# Patient Record
Sex: MALE | Race: WHITE | NOT HISPANIC OR LATINO | Employment: FULL TIME | URBAN - METROPOLITAN AREA
[De-identification: names, ages, dates, MRNs, and addresses within clinical notes are randomized per-mention and may not be internally consistent; named-entity substitution may affect disease eponyms.]

---

## 2017-04-12 ENCOUNTER — ALLSCRIPTS OFFICE VISIT (OUTPATIENT)
Dept: OTHER | Facility: OTHER | Age: 52
End: 2017-04-12

## 2017-06-07 ENCOUNTER — ALLSCRIPTS OFFICE VISIT (OUTPATIENT)
Dept: OTHER | Facility: OTHER | Age: 52
End: 2017-06-07

## 2017-11-15 ENCOUNTER — ALLSCRIPTS OFFICE VISIT (OUTPATIENT)
Dept: OTHER | Facility: OTHER | Age: 52
End: 2017-11-15

## 2017-11-17 ENCOUNTER — GENERIC CONVERSION - ENCOUNTER (OUTPATIENT)
Dept: OTHER | Facility: OTHER | Age: 52
End: 2017-11-17

## 2017-11-17 LAB
A/G RATIO (HISTORICAL): 2.1 (ref 1.2–2.2)
ALBUMIN SERPL BCP-MCNC: 4.6 G/DL (ref 3.5–5.5)
ALP SERPL-CCNC: 111 IU/L (ref 39–117)
ALT SERPL W P-5'-P-CCNC: 31 IU/L (ref 0–44)
AST SERPL W P-5'-P-CCNC: 31 IU/L (ref 0–40)
BILIRUB SERPL-MCNC: 0.9 MG/DL (ref 0–1.2)
BUN SERPL-MCNC: 14 MG/DL (ref 6–24)
BUN/CREA RATIO (HISTORICAL): 13 (ref 9–20)
CALCIUM SERPL-MCNC: 9.2 MG/DL (ref 8.7–10.2)
CHLORIDE SERPL-SCNC: 101 MMOL/L (ref 96–106)
CHOLEST SERPL-MCNC: 135 MG/DL (ref 100–199)
CO2 SERPL-SCNC: 21 MMOL/L (ref 18–29)
CREAT SERPL-MCNC: 1.06 MG/DL (ref 0.76–1.27)
CREATININE, URINE (HISTORICAL): 231.7 MG/DL
EGFR AFRICAN AMERICAN (HISTORICAL): 94 ML/MIN/1.73
EGFR-AMERICAN CALC (HISTORICAL): 81 ML/MIN/1.73
GLUCOSE SERPL-MCNC: 87 MG/DL (ref 65–99)
HDLC SERPL-MCNC: 31 MG/DL
LDLC SERPL CALC-MCNC: 75 MG/DL (ref 0–99)
MICROALBUM.,U,RANDOM (HISTORICAL): 5.7 UG/ML
MICROALBUMIN/CREATININE RATIO (HISTORICAL): 2.5 MG/G CREAT (ref 0–30)
POTASSIUM SERPL-SCNC: 3.9 MMOL/L (ref 3.5–5.2)
PROSTATE SPECIFIC ANTIGEN (HISTORICAL): 1 NG/ML (ref 0–4)
SODIUM SERPL-SCNC: 140 MMOL/L (ref 134–144)
TOT. GLOBULIN, SERUM (HISTORICAL): 2.2 G/DL (ref 1.5–4.5)
TOTAL PROTEIN (HISTORICAL): 6.8 G/DL (ref 6–8.5)
TRIGL SERPL-MCNC: 146 MG/DL (ref 0–149)
VLDLC SERPL CALC-MCNC: 29 MG/DL (ref 5–40)

## 2017-11-17 NOTE — PROGRESS NOTES
Assessment    1  Benign essential hypertension (401 1) (I10)   2  MTHFR mutation (270 4) (E72 12)   3  Hyperlipidemia (272 4) (E78 5)   4  Depression with anxiety (300 4) (F41 8)   5  Acute upper respiratory infection (465 9) (J06 9)    Plan  Abnormal stress ECG with treadmill, PMH: History of chest pain, Hyperlipidemia    · Rosuvastatin Calcium 40 MG Oral Tablet (Crestor); TAKE 1/2 TABLET DAILY   · Aspirin 81 MG TABS; take two tablets daily with food  Anxiety    · Citalopram Hydrobromide 40 MG Oral Tablet; 1 every day  Benign essential hypertension, Encounter for prostate cancer screening,Hyperlipidemia, MTHFR mutation, Obesity, Vitamin D deficiency    · (1) PSA (SCREEN) (Dx V76 44 Screen for Prostate Cancer); Status:Active; Requestedfor:01Ynw2448;   Benign essential hypertension, Hyperlipidemia    · Losartan Potassium 50 MG Oral Tablet; take half tablet for three and than daily  Benign essential hypertension, Hyperlipidemia, MTHFR mutation, Obesity, Vitamin Ddeficiency    · (1) MICROALBUMIN CREATININE RATIO, RANDOM URINE; Status:Active; Requestedfor:44Erx2674;    · (LC) Comp  Metabolic Panel (14); Status:Active; Requested for:90Vsq1268;    · (LC) Homocyst(e)ine, Plasma; Status:Active; Requested for:05Wnw7295;    · ([facility]) Lipid Panel; Status:Active; Requested for:62Igx5395;    · (LC) TSH Rfx on Abnormal to Free T4; Status:Active; Requested for:12Xks8432;    · (LC) Vitamin D, 25-Hydroxy, Total; Status:Active; Requested for:17Yoc1115;   Benign essential hypertension, PMH: History of chest pain, PMH: Myalgia    · Coenzyme Q-10 200 MG Oral Capsule; take 1 capsule daily  Benign essential hypertension, PMH: History of shortness of breath, PMH:Lightheadedness, MTHFR mutation    · Atenolol 25 MG Oral Tablet; TAKE 1 TABLET DAILY  Esophageal reflux    · RABEprazole Sodium 20 MG Oral Tablet Delayed Release (Aciphex); TAKE 1TABLET TWICE DAILY  Hyperlipidemia    · Ezetimibe 10 MG Oral Tablet;  Take 1 tablet daily  MTHFR mutation · Intrinsi B12-Folate 430-568-23 MCG-MCG-MG Oral Tablet; Take 1 tablet daily  Vitamin D deficiency    · Vitamin D 2000 UNIT Oral Capsule; one tab daily with food    Discussion/Summary    Labs are outdated, complete same  Patient has an appointment with Dr Maribel Greco in the near future, advised to complete labs prior to that visit  No medication changes at this time  Stable chronic conditions  infection is likely viral, may use over-the-counter guaifenesin and increase fluids as well as may use saline nasal spray as needed  Return to the office if symptoms worsen or fever develops  The patient was counseled regarding instructions for management,-- risk factor reductions,-- impressions,-- risks and benefits of treatment options,-- importance of compliance with treatment  Chief Complaint  Patient here for chronic condition follow up  Patient also complaining of sinus pain and pressure  bh/lpn      History of Present Illness  Multi chronic problem follow-up with one new complaint  Hypertension: Blood pressure at goal at the time of the visit  Labs are outdated  Patient reports periodic blood pressure testing shows stable control of his blood pressure  He reports compliance with his atenolol and losartan  No cardiovascular complaints of concern at the time of the visit  He has an appointment with his cardiologist Dr bowen goodwin in the near future  MTHFR polymorphism: Mono reports compliance with his intrinsics supplementation  His last homocystine level was a little above nine  He is due for re-evaluation with his current intrinsic therapy  He is maintaining a baby aspirin daily, his depression is currently stable see below  Depression: Mono reports that he feels his current dose of citalopram is providing adequate symptom remission for his depression  He denies depression or anxiety symptoms at the time of the visit despite having difficulties with his son coping with depression as well   He denies suicidal/homicidal ideation, he denies sleep disorder  He would like to continue his current dose of citalopram  See PHQ-9 below  Hyperlipidemia: Cleo Eagle is overdue for blood work to monitor his hyperlipidemia  His boss and heart did show an MTHFR polymorphism  He is maintaining his intrinsic therapy as above  He also reports that he is maintaining his receive a statin  He states he has missed some periods of time of his acetamide due to being on brand-name dispensed and cost  Plan to switch that to generic as itemized today  He denies medication side effects with his statin management medications  He is not currently taking fish oil  He is maintaining his Co Q10 therapy  New complaint onset a week and half ago patient reports noticing nasal congestion as well as worsening pain and pressure in the ears and in the maxillary sinus area he reports associated green nasal discharge and postnasal drip  He denies a cough or sore throat  No treatment at home  His symptoms are minimally improved since onset  The patient states his depression has been stable since the last visit  They have had recurrent episodes of major depression  He describes this as moderate in severity  He has no comorbid illnesses  He has had no significant interval events  Interval Symptoms: The patient is currently asymptomatic  Associated symptoms include:  No associated symptoms are reported  Structured Questionnaire:  Over the past 2 weeks, how often have you been bothered by the following problems? 1 ) Little interest or pleasure in doing things? Not at all   2 ) Feeling down, depressed or hopeless? Not at all   3 ) Trouble falling asleep or sleeping too much? Not at all   4 ) Feeling tired or having little energy? Not at all   5 ) Poor appetite or overeating? Not at all   6 ) Feeling bad about yourself, or that you are a failure, or have let yourself or your family down?  Not at all   7 ) Trouble concentrating on things, such as reading a newspaper or watching television? Not at all   8 ) Moving or speaking so slowly that other people could have noticed, or the opposite, moving or speaking faster than usual? Not at all  TOTAL SCORE: 0,-- severity of depression is mild  How difficult have these problems made it for you to do your work, take care of things at home, or get along with people? Not at all  Social Support: the patient has good social support  Medications: the patient is adherent with his medication regimen  -- He denies medication side effects  Review of Systems   Constitutional: No fever or chills, feels well, no tiredness, no recent weight gain or weight loss  Eyes: No complaints of eye pain, no red eyes, no discharge from eyes, no itchy eyes  ENT: as noted in HPI  Cardiovascular: No complaints of slow heart rate, no fast heart rate, no chest pain, no palpitations, no leg claudication, no lower extremity  Respiratory: No complaints of shortness of breath, no wheezing, no cough, no SOB on exertion, no orthopnea or PND  Gastrointestinal: No complaints of abdominal pain, no constipation, no nausea or vomiting, no diarrhea or bloody stools  Genitourinary: No complaints of dysuria, no incontinence, no hesitancy, no nocturia, no genital lesion, no testicular pain  Musculoskeletal: No complaints of arthralgia, no myalgias, no joint swelling or stiffness, no limb pain or swelling  Integumentary: No complaints of skin rash or skin lesions, no itching, no skin wound, no dry skin  Neurological: No compliants of headache, no confusion, no convulsions, no numbness or tingling, no dizziness or fainting, no limb weakness, no difficulty walking  Psychiatric: Is not suicidal, no sleep disturbances, no anxiety or depression, no change in personality, no emotional problems  Endocrine: No complaints of proptosis, no hot flashes, no muscle weakness, no erectile dysfunction, no deepening of the voice, no feelings of weakness  Hematologic/Lymphatic: No complaints of swollen glands, no swollen glands in the neck, does not bleed easily, no easy bruising  Active Problems  1  Abnormal stress ECG with treadmill (794 39) (R94 39)   2  Allergic rhinitis (477 9) (J30 9)   3  Anxiety (300 00) (F41 9)   4  Benign essential hypertension (401 1) (I10)   5  BMI 32 0-32 9,adult (V85 32) (Z68 32)   6  Colon cancer screening (V76 51) (Z12 11)   7  Cough (786 2) (R05)   8  Depression with anxiety (300 4) (F41 8)   9  Esophageal reflux (530 81) (K21 9)   10  Fatigue (780 79) (R53 83)   11  Gastritis (535 50) (K29 70)   12  Hyperlipidemia (272 4) (E78 5)   13  MTHFR mutation (270 4) (E72 12)   14  Obesity (278 00) (E66 9)   15  PND (post-nasal drip) (784 91) (R09 82)   16  Vitamin D deficiency (268 9) (E55 9)    Past Medical History  1  History of Body mass index (BMI) of 31 0 to 31 9 in adult (V85 31) (Z68 31)   2  History of chest pain (V13 89) (Z87 898)   3  History of gastroesophageal reflux (GERD) (V12 79) (Z87 19)   4  History of hiatal hernia (V12 79) (Z87 19)   5  History of hypertension (V12 59) (Z86 79)   6  History of shortness of breath (V13 89) (Z87 898)   7  History of Lightheadedness (780 4) (R42)   8  History of Myalgia (729 1) (M79 1)   9  History of Need for diphtheria-tetanus-pertussis (Tdap) vaccine (V06 1) (Z23)   10  History of Need for influenza vaccination (V04 81) (Z23)    Surgical History  1  History of Appendectomy   2  History of Surgery Vas Deferens Vasectomy   3  History of Varicose Vein Ligation    Family History  Mother    1  Family history of Coronary Artery Surgery   2  Family history of congestive cardiomyopathy (V17 49) (Z82 49)   3  Family history of hyperlipidemia (V18 19) (Z83 49)   4  Family history of hypertension (V17 49) (Z82 49)    The family history was reviewed and updated today         Social History     · Denied: History of Alcohol use   ·    · Never A Smoker   · Sleeps 6 -7 hours a day   · Stress at work (V62 1) (Z56 6)  The social history was reviewed and updated today  Current Meds   1  Aspirin 81 MG TABS; take two tablets daily with food; Therapy: 74WHP2074 to (Evaluate:02Oct2015) Recorded   2  Atenolol 25 MG Oral Tablet; TAKE 1 TABLET DAILY; Therapy: 08ITO3102 to (77 873 135)  Requested for: 08CIS3422; Last Rx:27Oct2017; Status: ACTIVE - Transmit to Pharmacy - Awaiting Verification Ordered   3  Citalopram Hydrobromide 40 MG Oral Tablet; 1 every day; Therapy: 62Fxk3299 to (Last Rx:07Jun2017)  Requested for: 07Jun2017 Ordered   4  Coenzyme Q-10 200 MG Oral Capsule; take 1 capsule daily; Therapy: 84XQY6817 to (Evaluate:13Nov2017)  Requested for: 18LZA6868; Last Rx:18Nov2016 Ordered   5  Intrinsi B12-Folate 146-750-13 MCG-MCG-MG Oral Tablet; Take 1 tablet daily; Therapy: 01QWT3132 to (Last Rx:25May2016) Ordered   6  Losartan Potassium 50 MG Oral Tablet; take half tablet for three and than daily; Therapy: 13JFO4445 to (Evaluate:13Nov2017)  Requested for: 20WGN6734; Last Rx:18Nov2016 Ordered   7  RABEprazole Sodium 20 MG Oral Tablet Delayed Release; TAKE 1 TABLET TWICE DAILY; Therapy: 88WWS3273 to (Evaluate:95Pfj5543)  Requested for: 24Oct2016; Last Rx:44Hju1026 Ordered   8  Rosuvastatin Calcium 40 MG Oral Tablet; TAKE 1/2 TABLET DAILY; Therapy: 29LBK4634 to (Evaluate:13Nov2017)  Requested for: 77DTY0942; Last Rx:18Nov2016 Ordered   9  Vitamin D 2000 UNIT Oral Capsule; one tab daily with food; Therapy: 27WDS5866 to (Last Rx:98Dok2928) Ordered   10  Zetia 10 MG Oral Tablet; TAKE ONE TABLET BY MOUTH EVERY DAY IN THE MORNING; Therapy: 17SLQ5377 to (Evaluate:17May2017)  Requested for: 40AYZ6852; Last  Rx:18Nov2016 Ordered    The medication list was reviewed and updated today  Allergies  1  AMOXICILLIN   2  Avelox TABS   3  Lipitor TABS   4   Zithromax Z-Cliff TABS    Vitals  Vital Signs    Recorded: 87GDR3897 04:08PM   Temperature 97 1 F   Heart Rate 60   Respiration 12   Systolic 778   Diastolic 80   Height 5 ft 11 in   Weight 254 lb    BMI Calculated 35 43   BSA Calculated 2 33       Physical Exam   Constitutional  General appearance: No acute distress, well appearing and well nourished  Eyes  Conjunctiva and lids: No swelling, erythema, or discharge  Ears, Nose, Mouth, and Throat  External inspection of ears and nose: Normal    Otoscopic examination: Tympanic membrance translucent with normal light reflex  Canals patent without erythema  Nasal mucosa, septum, and turbinates: Abnormal  -- Erythema, edema, mucoid discharge  No frontal or maxillary tenderness to palpation  Oropharynx: Normal with no erythema, edema, exudate or lesions  Pulmonary  Respiratory effort: No increased work of breathing or signs of respiratory distress  Auscultation of lungs: Clear to auscultation, equal breath sounds bilaterally, no wheezes, no rales, no rhonci  Cardiovascular  Auscultation of heart: Normal rate and rhythm, normal S1 and S2, without murmurs  Examination of extremities for edema and/or varicosities: Normal          Attending Note  Collaborating Note: I agree with the Advanced Practitioner note  Future Appointments    Date/Time Provider Specialty Site   12/01/2017 03:00 PM ALMA Pineda Junior   Cardiology  [id number] [facility] [address]       Signatures   Electronically signed by : Yasmin Loera; Nov 15 2017  5:05PM EST                       (Author)    Electronically signed by : Jenny Stern DO; Nov 16 2017  2:14PM EST                       (Author)

## 2017-11-18 LAB
HOMOCYSTINE PLASMA (HISTORICAL): 10.6 UMOL/L (ref 0–15)
INTERPRETATION (HISTORICAL): NORMAL
TSH SERPL DL<=0.05 MIU/L-ACNC: 2.33 UIU/ML (ref 0.45–4.5)

## 2017-11-20 ENCOUNTER — GENERIC CONVERSION - ENCOUNTER (OUTPATIENT)
Dept: OTHER | Facility: OTHER | Age: 52
End: 2017-11-20

## 2017-11-20 LAB — 25(OH)D3 SERPL-MCNC: 33 NG/ML

## 2017-11-21 ENCOUNTER — GENERIC CONVERSION - ENCOUNTER (OUTPATIENT)
Dept: OTHER | Facility: OTHER | Age: 52
End: 2017-11-21

## 2017-12-01 ENCOUNTER — ALLSCRIPTS OFFICE VISIT (OUTPATIENT)
Dept: OTHER | Facility: OTHER | Age: 52
End: 2017-12-01

## 2017-12-05 NOTE — PROGRESS NOTES
Assessment  Assessed    1  Abnormal stress ECG with treadmill (794 39) (R94 39)   2  Hyperlipidemia (272 4) (E78 5)   3  Benign essential hypertension (401 1) (I10)   4  Obesity (278 00) (E66 9)    Plan  Abnormal stress ECG with treadmill, Encounter for screening for cardiovasculardisorders    · EKG/ECG- POC; Status:Complete;   Done: [id number]   Perform: In Office; [phone]; Last Updated By:Alize Farooq; 12/1/2017 3:03:24 PM;Ordered; For:Abnormal stress ECG with treadmill, Encounter for screening for cardiovascular disorders; Ordered By:Erick Morrison;  Benign essential hypertension    · (1) BASIC METABOLIC PROFILE; Status:Active; Requested for:78Ued9507;    Perform:[facility]; Due:25Ohp2415; Ordered; For:Benign essential hypertension; Ordered By:Erick Morrison;  Hyperlipidemia    · Losartan Potassium-HCTZ 50-12 5 MG Oral Tablet; TAKE 1 TABLET DAILY   Rx By: Dorene Sepulvead; Dispense: 90 Days ; #:90 Tablet; Refill: 3;Hyperlipidemia; KERRY = N; Verified Transmission to [facility]; Last Updated By: System, [employer]; 12/1/2017 3:15:09 PM    Discussion/Summary  Discussion Summary:   Abnormal Myocardial Perfusion Study- Small reversible defect note in the inferior/septal wall at high work-load on his treadmill nuclear study  Risk factor of age, htn, hld, family hx of premature cad, and gender  Started on aggressive medical therapy with improved symptoms  continue aspirin 81 mgcrestor 20mg daiy + zetia 10mg daily + coenzyme q 200mg dailyatenolol 25mg dailydiet + exercise  Lipid Testing- His [facility] Panel explained in detail  His liver production markers of cholesterol are optimally controlled  His direct LDL is 811 and LP(a_ is 20  He has borderline small dense LDL but his HDL Apo A-1 particles levels is low at 14 5  This level can be improved with continue exercise, central obesity weight loss, and carb restriction   His absorber markers are controlled as his saturated and trans fat intake is low but his good cholesterol Omega-3 FA is low  He is currently not making arterial plaque as his LpPLA2 levels are low  Genetically he is negative for the [facility] B deficiency and therefore should not be statin intolerant but his vitamin D levels are low and should be replenished  omega 3 fatty acid supplementation 4000mg dailycontinue vigorous exercisezetiarecheck lipids periodically  elevatedadd Lisinopril 10mg daily/hctz 12 5mg daily  six months  Chief Complaint  Chief Complaint Free Text Note Form: 6 mo  f/u - ekg done today; no cardiac complaints today  ylm/ma      History of Present Illness  Cardiology HPI Free Text Note Form [facility]: 53 yo gentleman hx htn, hld, anxiety abnormal myocardial perfusion study s/p aggressive medial therapy  Has not had any angina and has not used nitroglycerin  Continues to exercise five times a week  However, gain eight pounds secondary to late night eating  Feels tired and sluggish- noted his bp running in the low 100s and 90s  Stress level is currently manageable  No pnd, orthopnea, edema  REports increased stress due to job loss  NOt exercising as much  Has had weight gain  NO edema  No chest pain  No palpitations  He reports being very physically active without having any chest tightness, arm, shoulder, jaw pain with exertion  He has been compliant with his medications  He is trying to eat better  He denies having any lower extremity edema  He denies having any dizziness or lightheadedness  He denies having any irregular heart rhythm  Review of Systems  Cardiology Male ROS:    Cardiac: AM fatigue, but-- as noted in HPI  Skin: No complaints of nonhealing sores or skin rash  Genitourinary: No complaints of recurrent urinary tract infections, frequent urination at night, difficult urination, blood in urine, kidney stones, loss of bladder control, no kidney or prostate problems, no erectile dysfunction    General: No complaints of trouble sleeping, lack of energy, fatigue, appetite changes, weight changes, fever, frequent infections, or night sweats  Respiratory: as noted in HPI  HEENT: No complaints of serious problems, hearing problems, nose problems, throat problems, or snoring  Gastrointestinal: No complaints of liver problems, nausea, vomiting, heartburn, constipation, bloody stools, diarrhea, problems swallowing, adbominal pain, or rectal bleeding  Hematologic: No complaints of bleeding disorders, anemia, blood clots, or excessive brusing  Active Problems  Problems    1  Abnormal stress ECG with treadmill (794 39) (R94 39)   2  Acute upper respiratory infection (465 9) (J06 9)   3  Allergic rhinitis (477 9) (J30 9)   4  Anxiety (300 00) (F41 9)   5  Benign essential hypertension (401 1) (I10)   6  BMI 32 0-32 9,adult (V85 32) (Z68 32)   7  Colon cancer screening (V76 51) (Z12 11)   8  Cough (786 2) (R05)   9  Depression with anxiety (300 4) (F41 8)   10  Encounter for prostate cancer screening (V76 44) (Z12 5)   11  Encounter for screening for cardiovascular disorders (V81 2) (Z13 6)   12  Esophageal reflux (530 81) (K21 9)   13  Fatigue (780 79) (R53 83)   14  Gastritis (535 50) (K29 70)   15  Hyperlipidemia (272 4) (E78 5)   16  MTHFR mutation (270 4) (E72 12)   17  Obesity (278 00) (E66 9)   18  PND (post-nasal drip) (784 91) (R09 82)   19  Vitamin D deficiency (268 9) (E55 9)    Past Medical History  Problems    1  History of Body mass index (BMI) of 31 0 to 31 9 in adult (V85 31) (Z68 31)   2  History of chest pain (V13 89) (Z87 898)   3  History of gastroesophageal reflux (GERD) (V12 79) (Z87 19)   4  History of hiatal hernia (V12 79) (Z87 19)   5  History of hypertension (V12 59) (Z86 79)   6  History of shortness of breath (V13 89) (Z87 898)   7  History of Lightheadedness (780 4) (R42)   8  History of Myalgia (729 1) (M79 1)   9  History of Need for diphtheria-tetanus-pertussis (Tdap) vaccine (V06 1) (Z23)   10   History of Need for influenza vaccination (V04 81) (Z23)  Active Problems And Past Medical History Reviewed: The active problems and past medical history were reviewed and updated today  Surgical History  Problems    1  History of Appendectomy   2  History of Surgery Vas Deferens Vasectomy   3  History of Varicose Vein Ligation  Surgical History Reviewed: The surgical history was reviewed and updated today  Family History  Mother    1  Family history of Coronary Artery Surgery   2  Family history of congestive cardiomyopathy (V17 49) (Z82 49)   3  Family history of hyperlipidemia (V18 19) (Z83 49)   4  Family history of hypertension (V17 49) (Z82 49)  Family History Reviewed: The family history was reviewed and updated today  Social History  Problems    · Denied: History of Alcohol use   ·    · Never A Smoker   · Sleeps 6 -7 hours a day   · Stress at work (V62 1) (Z56 6)  Social History Reviewed: The social history was reviewed and updated today  Current Meds   1  Aspirin 81 MG TABS; take two tablets daily with food; Therapy: 82XMN5750 to (Evaluate:02Oct2015) Recorded   2  Atenolol 25 MG Oral Tablet; TAKE 1 TABLET DAILY; Therapy: 38IAQ7913 to ([phone])  Requested for: 89VHK5211; Last Rx:27Oct2017; Status: ACTIVE - Transmit to Pharmacy - Awaiting Verification Ordered   3  Citalopram Hydrobromide 40 MG Oral Tablet; 1 every day; Therapy: 58Qqz0950 to (Last Rx:07Jun2017)  Requested for: 17XSW6078 Ordered   4  Coenzyme Q-10 200 MG Oral Capsule; take 1 capsule daily; Therapy: 79QEE2495 to (Evaluate:13Nov2017)  Requested for: 77MGP0244; Last Rx:18Nov2016 Ordered   5  Ezetimibe 10 MG Oral Tablet; Take 1 tablet daily; Therapy: 06HFV6663 to (Last Rx:13Wmi9438)  Requested for: 35IRR8678 Ordered   6  Intrinsi B12-Folate 967-910-47 MCG-MCG-MG Oral Tablet; Take 1 tablet daily; Therapy: 07GVZ0142 to (Last Rx:60Zoh1026) Ordered   7  Losartan Potassium 50 MG Oral Tablet; take half tablet for three and than daily;  Therapy: 76GIL2120 to (Evaluate:13Nov2017)  Requested for: 49HAO5986; Last Rx:18Nov2016 Ordered   8  Rosuvastatin Calcium 40 MG Oral Tablet; TAKE 1/2 TABLET DAILY; Therapy: 66RMN5848 to (Evaluate:13Nov2017)  Requested for: 45CGU1810; Last Rx:18Nov2016 Ordered   9  Vitamin D 2000 UNIT Oral Capsule; one tab daily with food; Therapy: 70DVX5071 to (Last Rx:20Gub6912) Ordered  Medication List Reviewed: The medication list was reviewed and updated today  Allergies  Medication    1  AMOXICILLIN   2  Avelox TABS   3  Lipitor TABS   4  Zithromax Z-Cliff TABS    Vitals  Vital Signs    Recorded: 06YZW9174 02:53PM   Heart Rate 54, Apical   Systolic 197, RUE, Sitting   Diastolic 90, RUE, Sitting   Height 5 ft 11 in   Weight 250 lb    BMI Calculated 34 87   BSA Calculated 2 32   O2 Saturation 98, RA       Physical Exam   Constitutional  General appearance: No acute distress, well appearing and well nourished  -- Well-kept, no conversational dyspnea  Eyes  Conjunctiva and Sclera examination: Conjunctiva pink, sclera anicteric  Ears, Nose, Mouth, and Throat - Oropharynx: Clear, nares are clear, mucous membranes are moist   Neck  Neck and thyroid: Normal, supple, trachea midline, no thyromegaly  Pulmonary  Respiratory effort: No increased work of breathing or signs of respiratory distress  Auscultation of lungs: Clear to auscultation, no rales, no rhonchi, no wheezing, good air movement  Cardiovascular  Palpation of heart: Abnormal  -- pmi midly displaced  Auscultation of heart: Normal rate and rhythm, normal S1 and S2, no murmurs  Chest - Chest: Normal   Abdomen  Abdomen: Abnormal    Liver and spleen: No hepatomegaly or splenomegaly  Musculoskeletal Gait and station: Normal gait  Neurologic - Speech: Normal    Psychiatric - Orientation to person, place, and time: Normal -- Mood and affect: Normal       Results/Data  Diagnostic Studies Reviewed Cardio: I personally reviewed the recording/images in the office today   My interpretation follows  Lab Review: 3/3/6538294  [facility] Panel explained in detail  His liver production markers of cholesterol are optimally controlled  His direct LDL is 811 and LP(a_ is 20  He has borderline small dense LDL but his HDL Apo A-1 particles levels is low at 14 5  This level can be improved with continue exercise, central obesity weight loss, and carb restriction  His absorber markers are controlled as his saturated and trans fat intake is low but his good cholesterol Omega-3 FA is low  He is currently not making arterial plaque as his LpPLA2 levels are low  Genetically he is negative for the [facility] B deficiency and therefore should not be statin intolerant but his vitamin D levels are low and should be replenished  [id number]  Stress Test: Myocardial Perfusion Study- Abnormal 5% of mid septal and inferior wall reversible perfusion study   2mm inferior and anterolateral st depressions/ 13 5mets activity- low risk territory abnormal stress test    ECG Report: sinus rhythm with nonspecific st-t wave changes, minimal voltage criteria for lvh      Signatures   Electronically signed by : ALMA Perez ; Dec  4 2017 11:24AM EST                       (Author)

## 2018-01-10 NOTE — RESULT NOTES
Message   appt to address high cholesterol/[facility] and new lab review  Return to me with appt date  thanks  Verified Results  ([facility]) Comp  Metabolic Panel (13) [id number] 12:00AM Norma Million     Test Name Result Flag Reference   Glucose, Serum 89 mg/dL  65-99   BUN 15 mg/dL  6-24   Creatinine, Serum 1 00 mg/dL  0 76-1 27   eGFR If NonAfricn Am 88 mL/min/1 73  >59   eGFR If Africn Am 102 mL/min/1 73  >59   BUN/Creatinine Ratio 15  9-20   Sodium, Serum 143 mmol/L  134-144   Potassium, Serum 4 4 mmol/L  3 5-5 2   Chloride, Serum 105 mmol/L     Carbon Dioxide, Total 20 mmol/L  18-29   Calcium, Serum 9 6 mg/dL  8 7-10 2   Protein, Total, Serum 6 9 g/dL  6 0-8 5   Albumin, Serum 4 6 g/dL  3 5-5 5   Globulin, Total 2 3 g/dL  1 5-4 5   A/G Ratio 2 0  1 1-2 5   Bilirubin, Total 0 9 mg/dL  0 0-1 2   Alkaline Phosphatase, S 95 IU/L     AST (SGOT) 24 IU/L  0-40     ([facility]) Lipid Panel [id number] 12:00AM Norma Million     Test Name Result Flag Reference   Cholesterol, Total 222 mg/dL H 100-199   Triglycerides 210 mg/dL H 0-149   HDL Cholesterol 31 mg/dL L >39   According to ATP-III Guidelines, HDL-C >59 mg/dL is considered a  negative risk factor for CHD  VLDL Cholesterol Allan 42 mg/dL H 5-40   LDL Cholesterol Calc 149 mg/dL H 0-99     ([facility]) TSH+Free T4 [id number] 12:00AM Norma Million     Test Name Result Flag Reference   TSH 2 200 uIU/mL  0 450-4 500   T4,Free(Direct) 1 04 ng/dL  0 82-1 77     [facility]) Cardiovascular Risk Assessment 03Jun2016 12:00AM Norma Million     Test Name Result Flag Reference   Interpretation Note     -------------------------------  CARDIOVASCULAR REPORT:  -------------------------------  Current available clinical information suggests the patient's risk is  at least INTERMEDIATE  Two major CHD risk factors are present (age  over 39 and HDL-C less than 40)  If the patient has CHD or a CHD risk  equivalent, the risk category is high   If patient does not have CHD or  a CHD risk equivalent, consider use of the Pooled Cohort Equations to  estimate 10-year CVD risk, as individuals with greater than 7 5% risk  may warrant more intensive therapy  The calculator can be found at:  http://tools  cardiosource org/WAGNS-Axjx-Ptnfdizip/  -  Insulin resistance, obesity, excessive alcohol use, smoking, nephrotic  syndrome, liver disease, and certain medications can cause secondary  dyslipidemia  Consider evaluation if clinically indicated  -  Therapeutic lifestyle changes are always valuable to achieve optimal  blood lipid status (diet, exercise, weight management)  -------------------------------  LIPID MANAGEMENT  Select one patient risk category based upon medical history and  clinical judgment  Additional risk factors such as personal or family  history of premature CHD, smoking, and hypertension modify a patient's  goals of therapy  In CVD prevention, the intensity of therapy should  be adjusted to the level of patient risk  MODERATE intensity statin  therapy generally results in an average LDL-C reduction of 30% to less  than 50% from the untreated baseline  Examples include (daily doses):  atorvastatin 10-20 mg, rosuvastatin 5-10 mg, simvastatin 20-40 mg,  pravastatin 40-80 mg, lovastatin 40 mg  HIGH intensity statin therapy  generally results in an average LDL-C reduction of 50% or more from  the untreated baseline  Examples include (daily doses): atorvastatin  40-80 mg and rosuvastatin 20 mg   -------------------------------  LOW RISK ASSESSMENT AND TREATMENT SUGGESTIONS  -------------------------------  LDL-C is acceptable, 149 mg/dL  Non-HDL Cholesterol is high, 191  mg/dL  -  Considerations for use of statin therapy include family history of  premature atherosclerotic disease, elevated coronary artery calcium  score, ankle-brachial index < 0 9, elevated CRP, or elevated 10-year  or lifetime CVD risk   If statin cannot be tolerated or increased,  alternatives include use of an intestinal agent (ezetimibe or bile  acid sequestrant), niacin, and/or fish oil   -------------------------------  INTERMEDIATE RISK ASSESSMENT AND TREATMENT SUGGESTIONS  -------------------------------  LDL-C is borderline high, 149 mg/dL  Non-HDL Cholesterol is high, 191  mg/dL  -  Consider beginning or increasing statin  Factors that may influence  statin use include family history of premature atherosclerotic  disease, elevated coronary artery calcium score, ankle-brachial index  < 0 9, elevated CRP, or elevated 10-year or lifetime CVD risk  If  statin cannot be tolerated or increased, alternatives include use of  an intestinal agent (ezetimibe or bile acid sequestrant), niacin,  and/or fish oil   -------------------------------  HIGH RISK ASSESSMENT AND TREATMENT SUGGESTIONS  -------------------------------  LDL-C is high, 149 mg/dL  Non-HDL Cholesterol is high, 191 mg/dL  -  Begin statin  If statin already in use, consider increasing dose to  achieve at least a 50% LDL reduction from baseline  Moderate or high  intensity statin is preferred  If statin cannot be tolerated or  increased, alternatives include use of an intestinal agent (ezetimibe  or bile acid sequestrant), niacin, and/or fish oil   -------------------------------  DISCLAIMER  These assessments and treatment suggestions are provided as a  convenience in support of the physician-patient relationship and are  not intended to replace the physician's clinical judgment  They are  derived from the national guidelines in addition to other evidence and  expert opinion  The clinician should consider this information within  the context of clinical opinion and the individual patient  SEE GUIDANCE FOR CARDIOVASCULAR REPORT: National Heart, Lung, and  Blood [place]'s Third Report of the NCEP Expert Panel on Detection,  Evaluation and Treatment of High Blood Cholesterol in Adults (ATP III)  (2002  NIH publication );  Chika et al  Diabetes Care 2008;  31(4):811-82; Naima et al  Clin Chem 2009; 55(3):407-419; [place]  et al  2013 ACC/AHA guideline on the treatment of blood cholesterol to  reduce atherosclerotic cardiovascular risk in adults: a report of the  [employer] of Cardiology/American Heart Association Task Force  on Practice Guidelines  Circulation 1360;765(EJSQM 2):S1? S45  PDF Image            Plan  Vitamin D deficiency    · Vitamin D (Ergocalciferol) 31894 UNIT Oral Capsule; TAKE 1 CAPSULE  WEEKLY

## 2018-01-11 NOTE — PROGRESS NOTES
Assessment   1  Encounter for preventive health examination (V70 0) (Z00 00)  2  MTHFR mutation (270 4) (E72 12)  3  Body mass index (BMI) of 31 0 to 31 9 in adult (V85 31) (Z68 31)  4  Obesity (278 00) (E66 9)    Plan  Anxiety    · Renew: ALPRAZolam 0 25 MG Oral Tablet; 1 Q8 HOURS  Benign essential hypertension, Hyperlipidemia    · ([facility]) Comp  Metabolic Panel (13); Status:Active; Requested for:14Dxc3277;    · ([facility]) Lipid Panel; Status:Active; Requested for:22Zur8561;    · ([facility]) TSH+Free T4; Status:Active; Requested for:47Bzd0936;   MTHFR mutation    · Start: Intrinsi B12-Folate [phone] MCG-MCG-MG Oral Tablet; Take 1 tablet daily  Need for diphtheria-tetanus-pertussis (Tdap) vaccine    · Administered: Adacel 5-2-15 5 LF-MCG/0 5 Intramuscular Suspension  Obesity    · Avoid alcoholic beverages ; Status:Complete;   Done: 68SDK8023   · Begin a limited exercise program ; Status:Complete;   Done: 75HVS9853   · Eat a low fat and low cholesterol diet ; Status:Complete;   Done: 45ESI6615   · Keep a diary of when and what you eat ; Status:Complete;   Done: 81RNV3605   · Some eating tips that can help you lose weight ; Status:Complete;   Done: 98XYZ2829   · We encourage all of our patients to exercise regularly  30 minutes of exercise or physical  activity five or more days a week is recommended for children and adults ;  Status:Complete;   Done: 06THG5455   · We recommend that you bring your body mass index down to 26 ; Status:Complete;    Done: 87IBP8715   · We recommend you modify your diet to achieve and maintain a healthy weight  Being  overweight may increase your risk for developing health problems such as diabetes,  heart disease, and cancer  Avoid high fat foods and eat a balanced diet rich  in fruits and vegetables  The combination of a reduced-calorie diet and increased  physical activity is recommended    Please let us know if you would like to  learn more about your nutrition and calorie needs, and additional options including  weight loss programs that can help you achieve your goals ; Status:Complete;   Done:  43TVI1537   · Call [phone] if: You are considering suicide ; Status:Complete;   Done:  98GEM2502   · Call [phone] if: You are having difficulty sleeping (insomnia) ; Status:Complete;    Done: 93KIU4165   · Call [phone] if: You are urinating too frequently ; Status:Complete;   Done:  78WKR9327   · Call [phone] if: You feel thirsty most of the time ; Status:Complete;   Done:  75USN3143   · Call [phone] if: You feel your heart is beating very fast or skipping beats ;  Status:Complete;   Done: 88GHT5167   · Call [phone] if: You have feelings of extreme sadness and feelings of  hopelessness ; Status:Complete;   Done: 05VIY4088   · Call [phone] if: You have pain in your abdomen ; Status:Complete;   Done:  41VPR1498   · Call [phone] if: You have symptoms of sleep apnea ; Status:Complete;   Done:  07NSZ9946   · Call 911 if: You have sudden or severe chest pain with shortness of breath, rapid  breathing, or cough ; Status:Complete;   Done: 88KVR1219   · Seek Immediate Medical Attention if: You experience a new kind of chest pain (angina)  or pressure ; Status:Complete;   Done: 23UVH8109  Vitamin D deficiency    · Start: Vitamin D 2000 UNIT Oral Capsule; one tab daily with food   · Renew: Vitamin D (Ergocalciferol) 21094 UNIT Oral Capsule; TAKE 1 CAPSULE  WEEKLY    Discussion/Summary  Impression: health maintenance visit, healthy adult male  Currently, he eats a healthy diet and has an inadequate exercise regimen  Prostate cancer screening: the risks and benefits of prostate cancer screening were discussed  Testicular cancer screening: the risks and benefits of testicular cancer screening were discussed, self testicular exam technique was taught and monthly self testicular exam was advised   Colorectal cancer screening: the risks and benefits of colorectal cancer screening were discussed, colorectal cancer screening is current and next due next year  Screening lab work includes glucose, lipid profile and vit d in fall post correction  The risks and benefits of immunizations were discussed, immunizations are needed and immunizations will be given as outlined in the orders  Advice and education were given regarding nutrition, aerobic exercise, weight bearing exercise, weight loss, vitamin D supplements, reproductive health, cardiovascular risk reduction, sunscreen use, self skin examination, helmet use and seat belt use  Repeat vit d in fall  take 2,000 IU daily after rx vit d done  begin intrinsi  Possible side effects of new medications were reviewed with the patient/guardian today  The patient was counseled regarding diagnostic results, instructions for management, risk factor reductions, impressions, risks and benefits of treatment options, importance of compliance with treatment  Chief Complaint  PATIENT PRESENTING FOR PHYSICAL      History of Present Illness  , Adult Male: The patient is being seen for a health maintenance evaluation  The last health maintenance visit was >1 year(s) ago  Social History: Household members include spouse  He is   Work status: working full time and occupation:  for [employer]  The patient has never smoked cigarettes  He reports never drinking alcohol  He has never used illicit drugs  General Health: The patient's health since the last visit is described as good  He has regular dental visits  He denies vision problems  He denies hearing loss  Immunizations status: not up to date  Lifestyle:  He consumes a diverse and healthy diet  He has weight concerns  He exercises regularly  He does not use tobacco  He denies alcohol use  He denies drug use  Reproductive health:  the patient is sexually active  birth control is being practiced (previous vasectomy )   He denies erectile dysfunction  Screening: cancer screening reviewed and current  metabolic screening reviewed and updated  risk screening reviewed and updated  Review of Systems    Constitutional: No fever or chills, feels well, no tiredness, no recent weight gain or weight loss  Eyes: No complaints of eye pain, no red eyes, no discharge from eyes, no itchy eyes  ENT: no complaints of earache, no hearing loss, no nosebleeds, no nasal discharge, no sore throat, no hoarseness  Cardiovascular: No complaints of slow heart rate, no fast heart rate, no chest pain, no palpitations, no leg claudication, no lower extremity  Respiratory: No complaints of shortness of breath, no wheezing, no cough, no SOB on exertion, no orthopnea or PND  Gastrointestinal: No complaints of abdominal pain, no constipation, no nausea or vomiting, no diarrhea or bloody stools  Genitourinary: No complaints of dysuria, no incontinence, no hesitancy, no nocturia, no genital lesion, no testicular pain  Musculoskeletal: No complaints of arthralgia, no myalgias, no joint swelling or stiffness, no limb pain or swelling  Integumentary: No complaints of skin rash or skin lesions, no itching, no skin wound, no dry skin  Neurological: No compliants of headache, no confusion, no convulsions, no numbness or tingling, no dizziness or fainting, no limb weakness, no difficulty walking  Psychiatric: Is not suicidal, no sleep disturbances, no anxiety or depression, no change in personality, no emotional problems  Endocrine: No complaints of proptosis, no hot flashes, no muscle weakness, no erectile dysfunction, no deepening of the voice, no feelings of weakness  Hematologic/Lymphatic: No complaints of swollen glands, no swollen glands in the neck, does not bleed easily, no easy bruising        Past Medical History    · History of gastroesophageal reflux (GERD) (V12 79) (Z87 19)   · History of hiatal hernia (V12 79) (Z87 19)   · History of hypertension (V12 59) (Z86 79)    Surgical History    · History of Appendectomy   · History of Surgery Vas Deferens Vasectomy   · History of Varicose Vein Ligation    Family History  Mother    · Family history of Coronary Artery Surgery   · Family history of congestive cardiomyopathy (V17 49) (Z82 49)   · Family history of hyperlipidemia (V18 19) (Z83 49)   · Family history of hypertension (V17 49) (Z82 49)    Social History    · Denied: History of Alcohol use   ·    · Never A Smoker   · Sleeps 6 -7 hours a day   · Stress at work (V62 1) (Z56 6)    Current Meds  1  ALPRAZolam 0 25 MG Oral Tablet; 1 Q8 HOURS; Therapy: 19Cvl2221 to (Last Rx:09Nct0762) Ordered  2  Aspirin 81 MG TABS; take two tablets daily with food; Therapy: 36HBV2207 to (Evaluate:02Oct2015) Recorded  3  Atenolol 50 MG Oral Tablet; TAKE 1/2 TABLET DAILY; Therapy: 89EKT3348 to (Evaluate:76Iaz2692)  Requested for: 33SAS4351; Last   Rx:39Ehg0577 Ordered  4  Citalopram Hydrobromide 20 MG Oral Tablet; TAKE 1 TABLET DAILY; Therapy: 47Hht1908 to (Evaluate:53Pzc5581)  Requested for: 46DTV2117; Last   Rx:37Zda3368 Ordered  5  Coenzyme Q-10 200 MG Oral Capsule; TAKE 1 CAPSULE DAILY; Therapy: 76UOS3826 to [phone])  Requested for: 90JUJ5284; Last   Rx:16Jan2015 Ordered  6  Crestor 40 MG Oral Tablet; TAKE 1/2 TABLET DAILY; Therapy: 74HZG6809 to (Evaluate:20Yju3433)  Requested for: 06Uru2958; Last   Rx:14Nqp9742 Ordered  7  Lisinopril 20 MG Oral Tablet; 1/2 Every Morning; Therapy: 49Ird0956 to (Evaluate:43Ovl8845)  Requested for: 10Jsk7548; Last   Rx:04Uoq2747 Ordered  8  Nitrostat 0 4 MG Sublingual Tablet Sublingual; PLACE 1 TABLET UNDER THE TONGUE   EVERY 5 MINUTES UP TO 3 DOSES AS NEEDED FOR CHEST PAIN;   Therapy: 10ZZP4384 to (Evaluate:43Jeh2003)  Requested for: 33DHH3293; Last   Rx:01Oct2014 Ordered  9  RABEprazole Sodium 20 MG Oral Tablet Delayed Release; TAKE 1 TABLET TWICE   DAILY;    Therapy: 92PMQ5726 to (Evaluate:33Gpu4025)  Requested for: 40GWJ2651; Last   Rx:39Nqp8433 Ordered  10  Vitamin D (Ergocalciferol) 96482 UNIT Oral Capsule; TAKE 1 CAPSULE WEEKLY; Therapy: 50OMC9866 to (Last Rx:14Skp0304)  Requested for: 51Kvs5042 Ordered    Allergies   1  AMOXICILLIN  2  Avelox TABS  3  Lipitor TABS  4  Zithromax Z-Cliff TABS    Vitals   Recorded: [id number] 02:58PM   Temperature 98 2 F, Temporal   Heart Rate 62, L Radial   Pulse Quality Normal, L Radial   Respiration 18   Respiration Quality Normal   Systolic 706, Sitting   Diastolic 80, Sitting   Height 6 ft 2 in   Weight 244 lb    BMI Calculated 31 33   BSA Calculated 2 36     Physical Exam    Constitutional   General appearance: No acute distress, well appearing and well nourished  Head and Face   Head and face: Normal     Palpation of the face and sinuses: No sinus tenderness  Eyes   Conjunctiva and lids: No erythema, swelling or discharge  Pupils and irises: Equal, round, reactive to light  Ophthalmoscopic examination: Normal fundi and optic discs  Ears, Nose, Mouth, and Throat   External inspection of ears and nose: Normal     Otoscopic examination: Tympanic membranes translucent with normal light reflex  Canals patent without erythema  Hearing: Normal     Nasal mucosa, septum, and turbinates: Normal without edema or erythema  Lips, teeth, and gums: Normal, good dentition  Oropharynx: Normal with no erythema, edema, exudate or lesions  Neck   Neck: Supple, symmetric, trachea midline, no masses  Thyroid: Normal, no thyromegaly  Pulmonary   Respiratory effort: No increased work of breathing or signs of respiratory distress  Auscultation of lungs: Clear to auscultation  Cardiovascular   Auscultation of heart: Normal rate and rhythm, normal S1 and S2, no murmurs  Peripheral vascular exam: Normal     Examination of extremities for edema and/or varicosities: Normal     Chest   Chest: Normal     Abdomen   Abdomen: Non-tender, no masses      Liver and spleen: No hepatomegaly or splenomegaly  Examination for hernias: No hernias appreciated  Anus, perineum, and rectum: Normal sphincter tone, no masses, no prolapse  Stool sample for occult blood: Negative  Genitourinary   Scrotal contents: Normal testes, no masses  Penis: Normal, no lesions  Digital rectal exam of prostate: Normal size, no masses  Lymphatic   Palpation of lymph nodes in neck: No lymphadenopathy  Palpation of lymph nodes in axillae: No lymphadenopathy  Palpation of lymph nodes in groin: No lymphadenopathy  Musculoskeletal   Gait and station: Normal     Inspection/palpation of digits and nails: Normal without clubbing or cyanosis  Inspection/palpation of joints, bones, and muscles: Normal     Range of motion: Normal     Stability: Normal     Muscle strength/tone: Normal     Skin   Skin and subcutaneous tissue: Normal without rashes or lesions  Neurologic   Cranial nerves: Cranial nerves 2-12 intact  Cortical function: Normal mental status  Reflexes: 2+ and symmetric  Sensation: No sensory loss  Coordination: Normal finger to nose and heel to shin  Psychiatric   Orientation to person, place and time: Normal     Mood and affect: Normal        Procedure    Procedure: Visual Acuity Test    Indication: routine screening  Inforrmation supplied by a Snellen chart  Results: 20/30 in both eyes without corrective device, 20/30 in the right eye without corrective device, 20/40 in the left eye without corrective device   Color vision was screened with the Ishihara Test and the results were normal    The patient was cooperative, but tolerated the procedure well        Signatures   Electronically signed by : Pam Lucio; May 25 2016  3:59PM EST                       (Author)    Electronically signed by : ALMA Vann ; May 26 2016  7:31AM EST                       (Author)

## 2018-01-11 NOTE — RESULT NOTES
Discussion/Summary   HDL (good cholesterol) is low  Exercise and a Paleo or [employer] will bring this up  Labs are otherwise normal  [address]     Verified Results  (1) MICROALBUMIN CREATININE RATIO, RANDOM URINE [id number] 07:55AM Bromiuml Trevizo     Test Name Result Flag Reference   Creatinine, Urine 231 7 mg/dL  Not Estab  Microalbumin, Urine 5 7 ug/mL  Not Estab  Microalb/Creat Ratio 2 5 mg/g creat  0 0-30 0     ([facility]) Lipid Panel [id number] 07:55AM Bromiuml Trevizo     Test Name Result Flag Reference   Cholesterol, Total 135 mg/dL  100-199   Triglycerides 146 mg/dL  0-149   HDL Cholesterol 31 mg/dL L >39   VLDL Cholesterol Allan 29 mg/dL  5-40   LDL Cholesterol Calc 75 mg/dL  0-99     ([facility]) TSH Rfx on Abnormal to Free T4 23IVK8870 07:55AM Bromiuml Trevizo     Test Name Result Flag Reference   TSH 2 330 uIU/mL  0 450-4 500     ([facility]) Homocyst(e)ine, Plasma 89DFW8739 07:55AM N-Trig     Test Name Result Flag Reference   Homocyst(e)ine, Plasma 10 6 umol/L  0 0-15 0     (1) PSA (SCREEN) (Dx V76 44 Screen for Prostate Cancer) [id number] 07:55AM N-Trig     Test Name Result Flag Reference   Prostate Specific Ag, Serum 1 0 ng/mL  0 0-4 0   Roche ECLIA methodology  According to the American Urological Association, Serum PSA should  decrease and remain at undetectable levels after radical  prostatectomy  The AUA defines biochemical recurrence as an initial  PSA value 0 2 ng/mL or greater followed by a subsequent confirmatory  PSA value 0 2 ng/mL or greater  Values obtained with different assay methods or kits cannot be used  interchangeably  Results cannot be interpreted as absolute evidence  of the presence or absence of malignant disease       (1) COMPREHENSIVE METABOLIC PANEL 65TIN3288 97:59EW Bromiuml Trevizo     Test Name Result Flag Reference   Glucose, Serum 87 mg/dL  65-99   BUN 14 mg/dL  6-24   Creatinine, Serum 1 06 mg/dL  0 76-1 27   BUN/Creatinine Ratio 13  9-20   Sodium, Serum 140 mmol/L  134-144   Potassium, Serum 3 9 mmol/L  3 5-5 2   Chloride, Serum 101 mmol/L     Carbon Dioxide, Total 21 mmol/L  18-29   Calcium, Serum 9 2 mg/dL  8 7-10 2   Protein, Total, Serum 6 8 g/dL  6 0-8 5   Albumin, Serum 4 6 g/dL  3 5-5 5   Globulin, Total 2 2 g/dL  1 5-4 5   A/G Ratio 2 1  1 2-2 2   Bilirubin, Total 0 9 mg/dL  0 0-1 2   Alkaline Phosphatase, S 111 IU/L     AST (SGOT) 31 IU/L  0-40   ALT (SGPT) 31 IU/L  0-44   eGFR If NonAfricn Am 81 mL/min/1 73  >59   eGFR If Africn Am 94 mL/min/1 73  >59     [facility]) Cardiovascular Risk Assessment [id number] 07:55AM Mango Armstrong     Test Name Result Flag Reference   Interpretation Note     -------------------------------  CARDIOVASCULAR REPORT:  -------------------------------  Current available clinical information suggests the  patient's risk is at least INTERMEDIATE  Two major CHD risk  factors are present (age over 39 and HDL-C less than 40)  If  the patient has CHD or a CHD risk equivalent, the risk  category is high  If patient does not have CHD or a CHD risk  equivalent, consider use of the Pooled Cohort Equations to  estimate 10-year CVD risk, as individuals with greater than  7 5% risk may warrant more intensive therapy  The calculator  can be found at:  http://tools  cardiosource org/XUZSM-Trak-Tppsitccx/  -  Insulin resistance, obesity, excessive alcohol use, smoking,  liver disease, and certain medications can cause secondary  dyslipidemia  Consider evaluation if clinically indicated  -  Therapeutic lifestyle changes are always valuable to achieve  optimal blood lipid status (diet, exercise, weight  management)  -------------------------------  LIPID MANAGEMENT  Select one patient risk category based upon medical history  and clinical judgment  Additional risk factors such as  personal or family history of premature CHD, smoking, and  hypertension modify a patient's goals of therapy   In CVD  prevention, the intensity of therapy should be adjusted to  the level of patient risk  MODERATE intensity statin therapy  generally results in an average LDL-C reduction of 30% to  less than 50% from the untreated baseline  Examples include  (daily doses): atorvastatin 10-20 mg, rosuvastatin 5-10 mg,  simvastatin 20-40 mg, pravastatin 40-80 mg, lovastatin 40  mg  HIGH intensity statin therapy generally results in an  average LDL-C reduction of 50% or more from the untreated  baseline  Examples include (daily doses): atorvastatin 40-80  mg and rosuvastatin 20 mg   -------------------------------  LOW RISK ASSESSMENT AND TREATMENT SUGGESTIONS  -------------------------------  LDL-C is optimal, was 149 and now is 75 mg/dL  Non-HDL  Cholesterol is optimal, was 191 and now is 104 mg/dL  -  Considerations for use of statin therapy include family  history of premature atherosclerotic disease, elevated  coronary artery calcium score, ankle-brachial index < 0 9,  elevated CRP, or elevated 10-year or lifetime CVD risk   -------------------------------  INTERMEDIATE RISK ASSESSMENT AND TREATMENT SUGGESTIONS  -------------------------------  LDL-C is optimal, was 149 and now is 75 mg/dL  Non-HDL  Cholesterol is optimal, was 191 and now is 104 mg/dL  -  Consider measurement of LDL particle number or Apo B to  adjudicate need for further LDL lowering therapy  Factors  that may influence statin use include family history of  premature atherosclerotic disease, elevated coronary artery  calcium score, ankle-brachial index < 0 9, elevated CRP, or  elevated 10-year or lifetime CVD risk  If statin cannot be  tolerated or increased, alternatives include use of an  intestinal agent (ezetimibe or bile acid sequestrant) or  niacin   -------------------------------  HIGH RISK ASSESSMENT AND TREATMENT SUGGESTIONS  -------------------------------  LDL-C is normal, was 149 and now is 75 mg/dL  Non-HDL  Cholesterol is normal, was 191 and now is 104 mg/dL    -  If at least a 50% LDL reduction from baseline has not been  achieved, begin or increase statin  Consider measurement of  LDL particle number or Apo B to adjudicate need for further  LDL lowering therapy  If statin cannot be tolerated or  increased, alternatives include use of an intestinal agent  (ezetimibe or bile acid sequestrant) or niacin   -------------------------------  DISCLAIMER  These assessments and treatment suggestions are provided as  a convenience in support of the physician-patient  relationship and are not intended to replace the physician's  clinical judgment  They are derived from national guidelines  in addition to other evidence and expert opinion  The  clinician should consider this information within the  context of clinical opinion and the individual patient  SEE GUIDANCE FOR CARDIOVASCULAR REPORT: Tyson Cifuentes et al  2013  ACC/AHA guideline on the treatment of blood cholesterol to  reduce atherosclerotic cardiovascular risk in adults: a  report of the [address] [place] [address] of Cardiology/American Heart  Association Task Force on Practice Guidelines  Circulation  2014; 129 (suppl 2): S1?S45; Sarahois et al  Clin Chem 2009;  55(3):407-419; Chika et al  Diabetes Care 2008;  31(4):811-82  PDF Image

## 2018-01-13 VITALS
BODY MASS INDEX: 35.56 KG/M2 | DIASTOLIC BLOOD PRESSURE: 80 MMHG | HEIGHT: 71 IN | HEART RATE: 60 BPM | RESPIRATION RATE: 12 BRPM | SYSTOLIC BLOOD PRESSURE: 132 MMHG | TEMPERATURE: 97.1 F | WEIGHT: 254 LBS

## 2018-01-14 VITALS
RESPIRATION RATE: 16 BRPM | WEIGHT: 257 LBS | HEIGHT: 72 IN | TEMPERATURE: 97.6 F | DIASTOLIC BLOOD PRESSURE: 80 MMHG | HEART RATE: 60 BPM | SYSTOLIC BLOOD PRESSURE: 128 MMHG | BODY MASS INDEX: 34.81 KG/M2

## 2018-01-14 VITALS
DIASTOLIC BLOOD PRESSURE: 82 MMHG | SYSTOLIC BLOOD PRESSURE: 112 MMHG | TEMPERATURE: 97.6 F | WEIGHT: 261 LBS | HEART RATE: 64 BPM | HEIGHT: 72 IN | RESPIRATION RATE: 18 BRPM | BODY MASS INDEX: 35.35 KG/M2

## 2018-01-16 NOTE — RESULT NOTES
Discussion/Summary   Normal Vitamin D level  [address]     Verified Results  ([address] [facility]) Vitamin D, 25-Hydroxy, Total [id number] 07:55AM Olivia Vaughn     Test Name Result Flag Reference   Vitamin D, 25-Hydroxy, Serum 33 ng/mL     Reference Range:   All Ages: Target levels 30 - 100

## 2018-01-23 ENCOUNTER — GENERIC CONVERSION - ENCOUNTER (OUTPATIENT)
Dept: OTHER | Facility: OTHER | Age: 53
End: 2018-01-23

## 2018-01-23 VITALS
BODY MASS INDEX: 35 KG/M2 | DIASTOLIC BLOOD PRESSURE: 90 MMHG | HEIGHT: 71 IN | OXYGEN SATURATION: 98 % | SYSTOLIC BLOOD PRESSURE: 132 MMHG | HEART RATE: 54 BPM | WEIGHT: 250 LBS

## 2018-01-23 LAB — GLUCOSE SERPL-MCNC: 97 MG/DL

## 2018-05-17 RX ORDER — ROSUVASTATIN CALCIUM 40 MG/1
0.5 TABLET, COATED ORAL DAILY
COMMUNITY
Start: 2014-10-01 | End: 2019-12-27

## 2018-05-17 RX ORDER — UBIDECARENONE 200 MG
1 CAPSULE ORAL DAILY
COMMUNITY
Start: 2015-01-16

## 2018-05-17 RX ORDER — RABEPRAZOLE SODIUM 20 MG/1
1 TABLET, DELAYED RELEASE ORAL 2 TIMES DAILY
COMMUNITY
Start: 2010-11-19 | End: 2020-07-31 | Stop reason: ALTCHOICE

## 2018-05-17 RX ORDER — VIT B12/INTRINSIC FACT/FOLATE 500-20-800
1 TABLET ORAL DAILY
COMMUNITY
Start: 2016-05-25

## 2018-05-17 RX ORDER — LOSARTAN POTASSIUM AND HYDROCHLOROTHIAZIDE 12.5; 5 MG/1; MG/1
1 TABLET ORAL DAILY
COMMUNITY
Start: 2017-12-01 | End: 2018-12-11 | Stop reason: SDUPTHER

## 2018-05-17 RX ORDER — MULTIVIT-MIN/IRON/FOLIC ACID/K 18-600-40
1 CAPSULE ORAL DAILY
COMMUNITY
Start: 2016-05-25

## 2018-05-17 RX ORDER — EZETIMIBE 10 MG/1
1 TABLET ORAL DAILY
COMMUNITY
Start: 2016-11-18 | End: 2019-12-27 | Stop reason: SDUPTHER

## 2018-05-17 RX ORDER — ATENOLOL 25 MG/1
1 TABLET ORAL DAILY
COMMUNITY
Start: 2016-11-18 | End: 2018-11-02 | Stop reason: SDUPTHER

## 2018-05-17 RX ORDER — CITALOPRAM 40 MG/1
TABLET ORAL DAILY
COMMUNITY
Start: 2014-08-27 | End: 2018-07-13 | Stop reason: SDUPTHER

## 2018-05-18 ENCOUNTER — OFFICE VISIT (OUTPATIENT)
Dept: CARDIOLOGY CLINIC | Facility: CLINIC | Age: 53
End: 2018-05-18
Payer: COMMERCIAL

## 2018-05-18 VITALS
WEIGHT: 254 LBS | HEART RATE: 63 BPM | BODY MASS INDEX: 35.56 KG/M2 | OXYGEN SATURATION: 98 % | HEIGHT: 71 IN | SYSTOLIC BLOOD PRESSURE: 122 MMHG | DIASTOLIC BLOOD PRESSURE: 78 MMHG

## 2018-05-18 DIAGNOSIS — R94.39 ABNORMAL STRESS ECG: Primary | ICD-10-CM

## 2018-05-18 DIAGNOSIS — E78.5 HYPERLIPIDEMIA, UNSPECIFIED HYPERLIPIDEMIA TYPE: ICD-10-CM

## 2018-05-18 PROCEDURE — 93000 ELECTROCARDIOGRAM COMPLETE: CPT | Performed by: INTERNAL MEDICINE

## 2018-05-18 PROCEDURE — 99214 OFFICE O/P EST MOD 30 MIN: CPT | Performed by: INTERNAL MEDICINE

## 2018-05-18 NOTE — PROGRESS NOTES
Cardiology Follow Up  Savi Broussard  1965  [id number]  [facility] Kera Monge [id number] [facility]    1  Abnormal stress ECG  POCT ECG   2  Hyperlipidemia, unspecified hyperlipidemia type  POCT ECG      Discussion/Plan:  Abnormal Myocardial Perfusion Study- Small reversible defect note in the inferior/septal wall at high work-load on his treadmill nuclear study  Risk factor of age, htn, hld, family hx of premature cad, and gender  Aggressive medical therapy with improved symptoms  Remain asymptomatic   - continue aspirin 81 mg  - crestor 20mg daiy + zetia 10mg daily + coenzyme q 200mg daily  - atenolol 25mg daily  - diet + exercise    Lipid Testing- His [facility] Heart Panel explained in detail  His liver production markers of cholesterol are optimally controlled  His direct LDL is 811 and LPa is 20  He has borderline small dense LDL but his HDL Apo A-1 particles levels is low at 14 5  This level can be improved with continue exercise, central obesity weight loss, and carb restriction  His absorber markers are controlled as his saturated and trans fat intake is low but his good cholesterol Omega-3 FA is low  He is currently not making arterial plaque as his LpPLA2 levels are low  Genetically he is negative for the [facility] deficiency and therefore should not be statin intolerant but his vitamin D levels are low and should be replenished  - omega 3 fatty acid supplementation 4000mg daily  - continue vigorous exercise  - zetia  - recheck lipids periodically    Elevated bp- improved  - losartan/hctz 50-12 5mg      six months  Interval History:  Less stress  He is walking a mile without any chest discomfort or shortness of breath  Denies having palpitations irregular heart rhythm  Been compliant medications  Denies feeling dizzy or lightheaded      There is no problem list on file for this patient  Past Medical History:   Diagnosis Date    Abnormal stress ECG with treadmill     Anxiety     Chest pain     GERD (gastroesophageal reflux disease)     Hiatal hernia     Hyperlipemia     Hypertension     Lightheadedness     MTHFR mutation (HCC)     Myalgia     Shortness of breath      Social History     Social History    Marital status: /Civil Union     Spouse name: N/A    Number of children: N/A    Years of education: N/A     Occupational History    Not on file       Social History Main Topics    Smoking status: Never Smoker    Smokeless tobacco: Never Used    Alcohol use No    Drug use: No    Sexual activity: Not on file     Other Topics Concern    Not on file     Social History Narrative    Sleeps 6-7 hours a day    Stress at work      Family History   Problem Relation Age of Onset    Coronary artery disease Mother      sx    Cardiomyopathy Mother      congestive    Hyperlipidemia Mother     Hypertension Mother      Past Surgical History:   Procedure Laterality Date    APPENDECTOMY      VASECTOMY      VEIN LIGATION      varicose       Current Outpatient Prescriptions:     aspirin 81 MG tablet, Take 2 tablets by mouth daily, Disp: , Rfl:     atenolol (TENORMIN) 25 mg tablet, Take 1 tablet by mouth daily, Disp: , Rfl:     Cholecalciferol (VITAMIN D) 2000 units CAPS, Take 1 capsule by mouth daily, Disp: , Rfl:     citalopram (CeleXA) 40 mg tablet, Take by mouth daily, Disp: , Rfl:     Coenzyme Q10 200 MG capsule, Take 1 capsule by mouth daily, Disp: , Rfl:     ezetimibe (ZETIA) 10 mg tablet, Take 1 tablet by mouth daily, Disp: , Rfl:     Folate-B12-Intrinsic Factor (INTRINSI F10-PJCYAN) 243-346-72 MCG-MCG-MG TABS, Take 1 tablet by mouth daily, Disp: , Rfl:     losartan-hydrochlorothiazide (HYZAAR) 50-12 5 mg per tablet, Take 1 tablet by mouth daily, Disp: , Rfl:     RABEprazole (ACIPHEX) 20 MG tablet, Take 1 tablet by mouth 2 (two) times a day, Disp: , Rfl:    rosuvastatin (CRESTOR) 40 MG tablet, Take 0 5 tablets by mouth daily, Disp: , Rfl:   Allergies   Allergen Reactions    Amoxicillin Hives     Reaction Date: [id number]; Annotation - [id number]: Javid Liz Atorvastatin Edema     Reaction Date: 10Sep2004;     Sulfa Antibiotics GI Intolerance     Zithromax z-alyssa - Reaction Date: 18Jul2011; Annotation - [id number]: gastritis  tc/cma    Moxifloxacin Rash     Reaction Date: 03Jun2008; Review of Systems:  Review of Systems   Constitutional: Negative  HENT: Negative  Eyes: Negative  Respiratory: Negative  Cardiovascular: Negative  Gastrointestinal: Negative  Endocrine: Negative  Genitourinary: Negative  Musculoskeletal: Negative  Skin: Negative  Allergic/Immunologic: Negative  Neurological: Negative  Hematological: Negative  Psychiatric/Behavioral: Negative  Vitals:    05/18/18 1434   BP: 122/78   BP Location: Right arm   Patient Position: Sitting   Cuff Size: Standard   Pulse: 63   SpO2: 98%   Weight: 115 kg (254 lb)   Height: 5' 11" (1 803 m)     Physical Exam:  Physical Exam   Constitutional: He is oriented to person, place, and time  No distress  HENT:   Head: Normocephalic and atraumatic  Right Ear: External ear normal    Left Ear: External ear normal    Eyes: Conjunctivae are normal  Pupils are equal, round, and reactive to light  Right eye exhibits no discharge  Left eye exhibits no discharge  No scleral icterus  Neck: Normal range of motion  Neck supple  No JVD present  No tracheal deviation present  No thyromegaly present  Cardiovascular: Normal rate and regular rhythm  Exam reveals no gallop and no friction rub  No murmur heard  Pulmonary/Chest: Effort normal and breath sounds normal  No stridor  No respiratory distress  He has no wheezes  He has no rales  He exhibits no tenderness  Abdominal: Soft  Bowel sounds are normal  He exhibits no distension and no mass  There is no tenderness   There is no rebound and no guarding  Musculoskeletal: Normal range of motion  He exhibits no edema, tenderness or deformity  Neurological: He is alert and oriented to person, place, and time  He has normal reflexes  No cranial nerve deficit  He exhibits normal muscle tone  Coordination normal    Skin: Skin is warm and dry  No rash noted  He is not diaphoretic  No erythema  No pallor  Psychiatric: He has a normal mood and affect  His behavior is normal  Judgment and thought content normal        Labs:   No results found for: WBC, HGB, HCT, MCV, PLT  Lab Results   Component Value Date     11/17/2017    K 3 9 11/17/2017     11/17/2017    CO2 21 11/17/2017    BUN 14 11/17/2017    CREATININE 1 06 11/17/2017    GLUCOSE 97 01/22/2018    CALCIUM 9 2 11/17/2017    AST 31 11/17/2017    ALT 31 11/17/2017    ALKPHOS 111 11/17/2017    PROT 6 8 11/17/2017    BILITOT 0 9 11/17/2017     Lab Results   Component Value Date    CHOL 135 11/17/2017    CHOL 222 (H) 06/03/2016     Lab Results   Component Value Date    HDL 31 (L) 11/17/2017    HDL 31 (L) 06/03/2016     Lab Results   Component Value Date    LDLCALC 75 11/17/2017    LDLCALC 149 (H) 06/03/2016     Lab Results   Component Value Date    TRIG 146 11/17/2017    TRIG 210 (H) 06/03/2016     No results found for: HGBA1C    Imaging & Testing   I have personally reviewed pertinent reports  EKG: Personally reviewed  Normal sinus rhythm lvh st-t wave changes anterolateral unchanged from prior ekg    Kameron Sullivan  Please call with any questions or suggestions    A description of the counseling:   Goals and Barriers:  Patient's ability to self care:  Medication side effect reviewed with patient in detail and all their questions answered  "This note has been constructed using a voice recognition system  Therefore there may be syntax, spelling, and/or grammatical errors   Please call if you have any questions  "

## 2018-07-13 DIAGNOSIS — F32.A DEPRESSION, UNSPECIFIED DEPRESSION TYPE: Primary | ICD-10-CM

## 2018-07-13 RX ORDER — CITALOPRAM 40 MG/1
40 TABLET ORAL DAILY
Qty: 30 TABLET | Refills: 0 | Status: SHIPPED | OUTPATIENT
Start: 2018-07-13 | End: 2018-07-17 | Stop reason: SDUPTHER

## 2018-07-17 ENCOUNTER — OFFICE VISIT (OUTPATIENT)
Dept: FAMILY MEDICINE CLINIC | Facility: CLINIC | Age: 53
End: 2018-07-17
Payer: COMMERCIAL

## 2018-07-17 VITALS
HEIGHT: 73 IN | TEMPERATURE: 97.7 F | WEIGHT: 255 LBS | DIASTOLIC BLOOD PRESSURE: 70 MMHG | BODY MASS INDEX: 33.8 KG/M2 | HEART RATE: 60 BPM | SYSTOLIC BLOOD PRESSURE: 110 MMHG | RESPIRATION RATE: 12 BRPM

## 2018-07-17 DIAGNOSIS — Z00.00 ROUTINE PHYSICAL EXAMINATION: Primary | ICD-10-CM

## 2018-07-17 DIAGNOSIS — I10 BENIGN ESSENTIAL HYPERTENSION: ICD-10-CM

## 2018-07-17 DIAGNOSIS — F41.8 DEPRESSION WITH ANXIETY: ICD-10-CM

## 2018-07-17 DIAGNOSIS — Z12.11 ENCOUNTER FOR SCREENING COLONOSCOPY: ICD-10-CM

## 2018-07-17 DIAGNOSIS — F32.A DEPRESSION, UNSPECIFIED DEPRESSION TYPE: ICD-10-CM

## 2018-07-17 DIAGNOSIS — Z12.5 PROSTATE CANCER SCREENING: ICD-10-CM

## 2018-07-17 DIAGNOSIS — Z15.89 MTHFR MUTATION: ICD-10-CM

## 2018-07-17 DIAGNOSIS — E78.5 HYPERLIPIDEMIA, UNSPECIFIED HYPERLIPIDEMIA TYPE: ICD-10-CM

## 2018-07-17 DIAGNOSIS — E55.9 VITAMIN D DEFICIENCY: ICD-10-CM

## 2018-07-17 PROCEDURE — 99396 PREV VISIT EST AGE 40-64: CPT | Performed by: NURSE PRACTITIONER

## 2018-07-17 RX ORDER — CITALOPRAM 40 MG/1
40 TABLET ORAL DAILY
Qty: 90 TABLET | Refills: 1 | Status: SHIPPED | OUTPATIENT
Start: 2018-07-17 | End: 2019-02-15 | Stop reason: SDUPTHER

## 2018-07-17 NOTE — PATIENT INSTRUCTIONS
Eat a healthy low glycemic balance diet  Exercise most days of the week workup to 45 minutes daily  Wear your seatbelt  Wear sunscreen  You due for blood work soon that is an 8 hour fast   He will be due again on or after November 18th that blood work is a 12 hour fast   You due for checkup toward the end of January see me then  See me sooner if you have any issues problems or questions  Make a nurse visit appointment in the fall to complete your flu shot

## 2018-07-17 NOTE — PROGRESS NOTES
Chief Complaint   Patient presents with    Physical Exam        Patient ID: Reyna Pearl is a 48 y o  male  Patient is here today for routine physical exam   He states he feels well in general other than his seasonal allergies seem to be bothering him a little bit  He denies fever, cough, pain in the face or teeth, sore throat or wheezing  He states he just has some chronic congestion and postnasal drip due to his seasonal allergies  Past Medical History:   Diagnosis Date    Abnormal stress ECG with treadmill     Anxiety     Chest pain     GERD (gastroesophageal reflux disease)     Hiatal hernia     Hyperlipemia     Hypertension     Lightheadedness     MTHFR mutation (HCC)     Myalgia     Shortness of breath        Past Surgical History:   Procedure Laterality Date    APPENDECTOMY      VASECTOMY      VEIN LIGATION      varicose       Patient Active Problem List   Diagnosis    Abnormal stress ECG with treadmill    Allergic rhinitis    Benign essential hypertension    Depression with anxiety    Hyperlipidemia    MTHFR mutation (HCC)    Obesity    PND (post-nasal drip)    Vitamin D deficiency    Routine physical examination       Family History   Problem Relation Age of Onset    Coronary artery disease Mother         sx   [employer] Cardiomyopathy Mother         congestive    Hyperlipidemia Mother     Hypertension Mother        Immunization History   Administered Date(s) Administered    Influenza TIV (IM) 10/13/2008, 11/25/2009, 10/19/2016    Td (adult), adsorbed 08/18/1999    Tdap 05/25/2016       Allergies   Allergen Reactions    Amoxicillin Hives     Reaction Date: [id number]; Annotation - [id number]: Magdi Mina Atorvastatin Edema     Reaction Date: 10Sep2004;     Sulfa Antibiotics GI Intolerance     Zithromax z-alyssa - Reaction Date: 18Jul2011; Annotation - [id number]: gastritis  tc/cma    Moxifloxacin Rash     Reaction Date: 03Jun2008;        Current Outpatient Prescriptions Medication Sig Dispense Refill    aspirin 81 MG tablet Take 2 tablets by mouth daily      atenolol (TENORMIN) 25 mg tablet Take 1 tablet by mouth daily      Cholecalciferol (VITAMIN D) 2000 units CAPS Take 1 capsule by mouth daily      citalopram (CeleXA) 40 mg tablet Take 1 tablet (40 mg total) by mouth daily 90 tablet 1    Coenzyme Q10 200 MG capsule Take 1 capsule by mouth daily      ezetimibe (ZETIA) 10 mg tablet Take 1 tablet by mouth daily      Folate-B12-Intrinsic Factor (INTRINSI B12-FOLATE) 552-311-72 MCG-MCG-MG TABS Take 1 tablet by mouth daily      losartan-hydrochlorothiazide (HYZAAR) 50-12 5 mg per tablet Take 1 tablet by mouth daily      RABEprazole (ACIPHEX) 20 MG tablet Take 1 tablet by mouth 2 (two) times a day      rosuvastatin (CRESTOR) 40 MG tablet Take 0 5 tablets by mouth daily       No current facility-administered medications for this visit  Social History     Social History    Marital status: /Civil Union     Spouse name: N/A    Number of children: N/A    Years of education: N/A     Social History Main Topics    Smoking status: Never Smoker    Smokeless tobacco: Never Used    Alcohol use No    Drug use: No    Sexual activity: Not Asked     Other Topics Concern    None     Social History Narrative    Sleeps 6-7 hours a day    Stress at work       Review of Systems   Constitutional: Negative  HENT: Positive for congestion, postnasal drip and rhinorrhea  Negative for ear pain, sinus pain and sore throat  Eyes: Negative  Respiratory: Negative  Cardiovascular: Negative  Gastrointestinal: Negative  Endocrine: Negative  Genitourinary: Negative  Musculoskeletal: Negative  Skin: Negative  Allergic/Immunologic: Negative  Neurological: Negative  Hematological: Negative  Psychiatric/Behavioral: Negative            Objective:    /70 (BP Location: Left arm, Patient Position: Sitting, Cuff Size: Adult)   Pulse 60   Temp 97 7 °F (36 5 °C) (Tympanic)   Resp 12   Ht 6' 1" (1 854 m)   Wt 116 kg (255 lb)   BMI 33 64 kg/m²        Physical Exam   Constitutional: He is oriented to person, place, and time  He appears well-developed and well-nourished  No distress  HENT:   Head: Normocephalic and atraumatic  Right Ear: External ear normal    Left Ear: External ear normal    Nose: Nose normal    Mouth/Throat: Oropharynx is clear and moist  No oropharyngeal exudate  Eyes: Conjunctivae and EOM are normal  Pupils are equal, round, and reactive to light  No scleral icterus  Neck: Normal range of motion  Neck supple  No JVD present  No thyromegaly present  Cardiovascular: Normal rate, regular rhythm and normal heart sounds  Pulmonary/Chest: Effort normal and breath sounds normal  No respiratory distress  Abdominal: Soft  Bowel sounds are normal  He exhibits no mass  There is no tenderness  Genitourinary:   Genitourinary Comments: Scrotal contents: Normal testes, no masses  Penis: Normal, no lesions  Digital rectal exam of prostate: Normal size, no masses  Musculoskeletal: Normal range of motion  He exhibits no edema or deformity  Lymphadenopathy:     He has no cervical adenopathy  Neurological: He is alert and oriented to person, place, and time  He has normal reflexes  No cranial nerve deficit  He exhibits normal muscle tone  Coordination normal    Skin: Skin is warm and dry  No rash noted  No erythema  Psychiatric: He has a normal mood and affect  His behavior is normal  Judgment and thought content normal              Assessment/Plan:    No problem-specific Assessment & Plan notes found for this encounter  Diagnoses and all orders for this visit:    Routine physical examination  -     Comprehensive metabolic panel; Future  -     Homocysteine, serum; Future  -     Vitamin D 25 hydroxy; Future    Encounter for screening colonoscopy  -     Ambulatory referral to Gastroenterology;  Future  -     PSA, total and free; Future    Vitamin D deficiency  -     Vitamin D 25 hydroxy; Future    MTHFR mutation (HCC)  -     Homocysteine, serum; Future  -     Comprehensive metabolic panel; Future  -     Lipid panel; Future    Hyperlipidemia, unspecified hyperlipidemia type  -     Comprehensive metabolic panel; Future  -     Lipid panel; Future  -     TSH, 3rd generation with T4 reflex; Future    Benign essential hypertension  -     Comprehensive metabolic panel; Future  -     Homocysteine, serum; Future  -     Comprehensive metabolic panel; Future  -     Lipid panel; Future  -     TSH, 3rd generation with T4 reflex; Future    Prostate cancer screening  -     PSA, total and free; Future    Depression, unspecified depression type  -     citalopram (CeleXA) 40 mg tablet; Take 1 tablet (40 mg total) by mouth daily    Depression with anxiety            Patient Instructions   Eat a healthy low glycemic balance diet  Exercise most days of the week workup to 45 minutes daily  Wear your seatbelt  Wear sunscreen  You due for blood work soon that is an 8 hour fast   He will be due again on or after November 18th that blood work is a 12 hour fast   You due for checkup toward the end of January see me then  See me sooner if you have any issues problems or questions  Make a nurse visit appointment in the fall to complete your flu shot                      Emmanuel Newman

## 2018-07-28 LAB
25(OH)D3+25(OH)D2 SERPL-MCNC: 24 NG/ML (ref 30–100)
ALBUMIN SERPL-MCNC: 4.2 G/DL (ref 3.5–5.5)
ALBUMIN/GLOB SERPL: 1.7 {RATIO} (ref 1.2–2.2)
ALP SERPL-CCNC: 117 IU/L (ref 39–117)
ALT SERPL-CCNC: 31 IU/L (ref 0–44)
AMBIG ABBREV DEFAULT: NORMAL
AST SERPL-CCNC: 25 IU/L (ref 0–40)
BILIRUB SERPL-MCNC: 0.5 MG/DL (ref 0–1.2)
BUN SERPL-MCNC: 14 MG/DL (ref 6–24)
BUN/CREAT SERPL: 14 (ref 9–20)
CALCIUM SERPL-MCNC: 9.1 MG/DL (ref 8.7–10.2)
CHLORIDE SERPL-SCNC: 102 MMOL/L (ref 96–106)
CO2 SERPL-SCNC: 21 MMOL/L (ref 20–29)
CREAT SERPL-MCNC: 1 MG/DL (ref 0.76–1.27)
GLOBULIN SER-MCNC: 2.5 G/DL (ref 1.5–4.5)
GLUCOSE SERPL-MCNC: 91 MG/DL (ref 65–99)
HCYS SERPL-SCNC: 11.9 UMOL/L (ref 0–15)
POTASSIUM SERPL-SCNC: 4 MMOL/L (ref 3.5–5.2)
PROT SERPL-MCNC: 6.7 G/DL (ref 6–8.5)
SL AMB EGFR AFRICAN AMERICAN: 100 ML/MIN/1.73
SL AMB EGFR NON AFRICAN AMERICAN: 87 ML/MIN/1.73
SODIUM SERPL-SCNC: 140 MMOL/L (ref 134–144)

## 2018-08-08 DIAGNOSIS — F41.8 DEPRESSION WITH ANXIETY: Primary | ICD-10-CM

## 2018-08-08 RX ORDER — HYDROXYZINE HYDROCHLORIDE 25 MG/1
25 TABLET, FILM COATED ORAL EVERY 6 HOURS PRN
Qty: 30 TABLET | Refills: 1 | Status: ON HOLD | OUTPATIENT
Start: 2018-08-08 | End: 2019-04-04 | Stop reason: ALTCHOICE

## 2018-08-08 RX ORDER — ALPRAZOLAM 0.25 MG/1
0.25 TABLET ORAL EVERY 8 HOURS PRN
Qty: 30 TABLET | Refills: 0 | Status: CANCELLED | OUTPATIENT
Start: 2018-08-08

## 2018-08-08 NOTE — TELEPHONE ENCOUNTER
CALLED PT ADVISED SAME, HE SAID HE IS NOT COMING BACK IN FOR THIS WAS JUST HERE AND FORGOT TO ASK YOU FOR IT, PLEASE ADVISE

## 2018-11-02 ENCOUNTER — TELEPHONE (OUTPATIENT)
Dept: CARDIOLOGY CLINIC | Facility: CLINIC | Age: 53
End: 2018-11-02

## 2018-11-02 DIAGNOSIS — I25.10 CAD IN NATIVE ARTERY: Primary | ICD-10-CM

## 2018-11-04 RX ORDER — ATENOLOL 25 MG/1
25 TABLET ORAL DAILY
Qty: 90 TABLET | Refills: 3 | Status: SHIPPED | OUTPATIENT
Start: 2018-11-04 | End: 2019-11-11 | Stop reason: SDUPTHER

## 2018-12-11 DIAGNOSIS — I10 HYPERTENSION, UNSPECIFIED TYPE: Primary | ICD-10-CM

## 2018-12-11 LAB
ALBUMIN SERPL-MCNC: 4 G/DL (ref 3.5–5.5)
ALBUMIN/GLOB SERPL: 1.7 {RATIO} (ref 1.2–2.2)
ALP SERPL-CCNC: 101 IU/L (ref 39–117)
ALT SERPL-CCNC: 28 IU/L (ref 0–44)
AST SERPL-CCNC: 23 IU/L (ref 0–40)
BILIRUB SERPL-MCNC: 0.6 MG/DL (ref 0–1.2)
BUN SERPL-MCNC: 13 MG/DL (ref 6–24)
BUN/CREAT SERPL: 13 (ref 9–20)
CALCIUM SERPL-MCNC: 8.8 MG/DL (ref 8.7–10.2)
CHLORIDE SERPL-SCNC: 105 MMOL/L (ref 96–106)
CHOLEST SERPL-MCNC: 162 MG/DL (ref 100–199)
CO2 SERPL-SCNC: 22 MMOL/L (ref 20–29)
CREAT SERPL-MCNC: 1.04 MG/DL (ref 0.76–1.27)
GLOBULIN SER-MCNC: 2.3 G/DL (ref 1.5–4.5)
GLUCOSE SERPL-MCNC: 98 MG/DL (ref 65–99)
HDLC SERPL-MCNC: 29 MG/DL
LABCORP COMMENT: NORMAL
LDLC SERPL CALC-MCNC: 98 MG/DL (ref 0–99)
MICRODELETION SYND BLD/T FISH: NORMAL
POTASSIUM SERPL-SCNC: 3.5 MMOL/L (ref 3.5–5.2)
PROT SERPL-MCNC: 6.3 G/DL (ref 6–8.5)
PSA FREE MFR SERPL: 20 %
PSA FREE SERPL-MCNC: 0.14 NG/ML
PSA SERPL-MCNC: 0.7 NG/ML (ref 0–4)
SL AMB EGFR AFRICAN AMERICAN: 96 ML/MIN/1.73
SL AMB EGFR NON AFRICAN AMERICAN: 83 ML/MIN/1.73
SL AMB VLDL CHOLESTEROL CALC: 35 MG/DL (ref 5–40)
SODIUM SERPL-SCNC: 142 MMOL/L (ref 134–144)
TRIGL SERPL-MCNC: 177 MG/DL (ref 0–149)
TSH SERPL DL<=0.005 MIU/L-ACNC: 2.27 UIU/ML (ref 0.45–4.5)

## 2018-12-11 RX ORDER — LOSARTAN POTASSIUM AND HYDROCHLOROTHIAZIDE 12.5; 5 MG/1; MG/1
1 TABLET ORAL DAILY
Qty: 90 TABLET | Refills: 3 | Status: SHIPPED | OUTPATIENT
Start: 2018-12-11 | End: 2019-06-24 | Stop reason: SDUPTHER

## 2019-01-31 ENCOUNTER — OFFICE VISIT (OUTPATIENT)
Dept: OTOLARYNGOLOGY | Facility: CLINIC | Age: 54
End: 2019-01-31
Payer: COMMERCIAL

## 2019-01-31 VITALS
SYSTOLIC BLOOD PRESSURE: 114 MMHG | HEIGHT: 73 IN | TEMPERATURE: 98 F | WEIGHT: 258 LBS | DIASTOLIC BLOOD PRESSURE: 80 MMHG | BODY MASS INDEX: 34.19 KG/M2

## 2019-01-31 DIAGNOSIS — J34.2 NASAL SEPTAL DEVIATION: ICD-10-CM

## 2019-01-31 DIAGNOSIS — J32.0 CHRONIC MAXILLARY SINUSITIS: Primary | ICD-10-CM

## 2019-01-31 DIAGNOSIS — J34.3 NASAL TURBINATE HYPERTROPHY: ICD-10-CM

## 2019-01-31 PROCEDURE — 99243 OFF/OP CNSLTJ NEW/EST LOW 30: CPT | Performed by: OTOLARYNGOLOGY

## 2019-01-31 PROCEDURE — 31231 NASAL ENDOSCOPY DX: CPT | Performed by: OTOLARYNGOLOGY

## 2019-01-31 NOTE — LETTER
January 31, 2019     Latricia Bowen MD  [address] [facility]    Patient: rBianna Qiu   YOB: 1965   Date of Visit: 1/31/2019       Dear Dr Surekha Orellana: Thank you for referring James Minoo to me for evaluation  Below are my notes for this consultation  If you have questions, please do not hesitate to call me  I look forward to following your patient along with you  Sincerely,        Timmy Jones MD        CC: No Recipients  Timmy Jones MD  1/31/2019 11:42 AM  Sign at close encounter  [facility] [place] New Patient Visit    Brianna Qiu is a 48 y o  who presents with a chief complaint of sinus problems    Pertinent elements of the history include:  He presents with sinus problems for "his whole life "  Symptoms include nasal congestion, post nasal drip and aural fullness  Ears feel like there is "always fluid in them" with associated autophony -- these symptoms are intermittent  Associated clear to green rhinorrhea  He has a known septal deviation as well  He has moderate nasal obstruction  Waxing and waning sinus pressure midface and frontal sinus region -- worse with a "bad sinus infection" which can happen throughout the year  He has been recurrently treated with antibiotics (often Doxycycline) for his symptoms -- at least twice over the past 2 months  No recent sinus imaging  He denies sinus headaches  He was recently on Doxcycyline for a recent sinus infection  Sense of smell is poor  He has also tried Nasacort and Nasonex for months in the past, but not recently  He has used saline rinses in the past as well -- feels like this makes him worse  Nasal congestion tends to be worse at night (increased obstruction and PND as well), especially when lying supine  He has been offered sinonasal surgery for these issues by a previous ENT and declined -- not sure why        Review of systems 10 point review of systems reviewed as documented in the intake form, scanned into the medical record under the media tab  Results reviewed; images from any scan have been personally reviewed: The past medical, surgical, social and family history have been reviewed as documented in today's record  Physical exam: (abnormal findings appear in bold and supercede any conflicting normal findings listed below)    /80 (BP Location: Left arm, Patient Position: Sitting, Cuff Size: Adult)   Temp 98 °F (36 7 °C) (Oral)   Ht 6' 1" (1 854 m)   Wt 117 kg (258 lb)   BMI 34 04 kg/m²      Constitutional:  Well developed, well nourished and groomed, in no acute distress  Eyes:  Extra-ocular movements intact, pupils equally round and reactive to light and accommodation, the lids and conjunctivae are normal in appearance  Head: Atraumatic, normocephalic, no visible scalp lesions, bony palpation unremarkable without stepoffs, parotid and submandibular salivary glands non-tender to palpation and without masses bilaterally  Ears:  Auricles normal in appearance bilaterally, mastoid prominence non-tender, external auditory canals clear bilaterally, tympanic membranes intact bilaterally without evidence of middle ear effusion or masses, normal appearing ossicles  Nose/Sinuses:  External appearance unremarkable, no maxillary or frontal sinus tenderness to palpation bilaterally  Anterior rhinoscopy reveals: An extreme right septal deviation with folding of the quadrangular cartilage leading to a 99% right-sided nasal obstruction with left-sided turbinate hypertrophy and mucosal erythema and edema  Oral Cavity:  Moist mucus membranes, gums and dentition unremarkable, no oral mucosal masses or lesions, floor of mouth soft, tongue mobile without masses or lesions  Oropharynx:  Base of tongue soft and without masses, tonsils bilaterally unremarkable, soft palate mucosa unremarkable, laryngeal mirror exam unrevealing       Neck: No visible or palpable cervical lesions or lymphadenopathy, thyroid gland is normal in size and symmetry and without masses, normal laryngeal elevation with swallowing  Cardiovascular:  Normal rate and rhythm, no palpable thrills, no jugulovenous distension observed  Respiratory:  Normal respiratory effort without evidence of retractions or use of accessory muscles  Integument:  Normal appearing without observed masses or lesions  Neurologic:  Cranial nerves II-XII intact bilaterally  Psychiatric:  Alert and oriented to time, place and person, normal affect  Procedures  The nasal cavities were decongested with lidocaine and oxymetazoline spray  Bilateral nasal endoscopy was performed as follows: Location: Bilateral nasal cavity  Endoscopy type: Rigid nasal endoscopy  Results: The right middle meatus could not be examined due to obstruction from the septal deflection  On the left there is mucosal friability of the inferior and middle turbinate with no masses exudates or polyps seen  The patient tolerated the procedure well  Assessment:   1  Chronic maxillary sinusitis  CT sinus wo contrast   2  Nasal turbinate hypertrophy     3  Nasal septal deviation         Orders  Orders Placed This Encounter   Procedures    CT sinus wo contrast     Standing Status:   Future     Standing Expiration Date:   1/31/2023     Scheduling Instructions: There is no prep for this study  Please bring your insurance cards, a form of photo ID and a list of your medications with you  Arrive 15 minutes prior to your appointment time to register  On the day of your test, please bring any prior CT or MRI studies of this area with you that were not performed at a [facility]  To schedule this appointment, please contact Central Scheduling at [phone]  Order Specific Question:   What is the patient's sedation requirement? Answer:   No Sedation         Discussion/Plan:    1  It is my impression that he has nasal obstruction and congestion due to a significant septal deviation and turbinate enlargement refractory to appropriate medical management in the past as outlined above  Additionally he has symptoms of both chronic and recurrent acute sinusitis that has been treated with multiple rounds of antibiotics and nasal steroids and nasal saline and also oral steroids in the past   Therefore I recommended a CT sinus  He will follow up after the CT sinus to discuss whether he needs sinus surgery in addition to a septoplasty and bilateral inferior turbinate reduction  Thank you for allowing me to participate in the care of your patient    All

## 2019-01-31 NOTE — PROGRESS NOTES
[facility] Otolaryngology New Patient Visit    Russell Fierro is a 48 y o  who presents with a chief complaint of sinus problems    Pertinent elements of the history include:  He presents with sinus problems for "his whole life "  Symptoms include nasal congestion, post nasal drip and aural fullness  Ears feel like there is "always fluid in them" with associated autophony -- these symptoms are intermittent  Associated clear to green rhinorrhea  He has a known septal deviation as well  He has moderate nasal obstruction  Waxing and waning sinus pressure midface and frontal sinus region -- worse with a "bad sinus infection" which can happen throughout the year  He has been recurrently treated with antibiotics (often Doxycycline) for his symptoms -- at least twice over the past 2 months  No recent sinus imaging  He denies sinus headaches  He was recently on Doxcycyline for a recent sinus infection  Sense of smell is poor  He has also tried Nasacort and Nasonex for months in the past, but not recently  He has used saline rinses in the past as well -- feels like this makes him worse  Nasal congestion tends to be worse at night (increased obstruction and PND as well), especially when lying supine  He has been offered sinonasal surgery for these issues by a previous ENT and declined -- not sure why  Review of systems 10 point review of systems reviewed as documented in the intake form, scanned into the medical record under the media tab  Results reviewed; images from any scan have been personally reviewed: The past medical, surgical, social and family history have been reviewed as documented in today's record      Physical exam: (abnormal findings appear in bold and supercede any conflicting normal findings listed below)    /80 (BP Location: Left arm, Patient Position: Sitting, Cuff Size: Adult)   Temp 98 °F (36 7 °C) (Oral)   Ht 6' 1" (1 854 m)   Wt 117 kg (258 lb)   BMI 34 04 kg/m²     Constitutional:  Well developed, well nourished and groomed, in no acute distress  Eyes:  Extra-ocular movements intact, pupils equally round and reactive to light and accommodation, the lids and conjunctivae are normal in appearance  Head: Atraumatic, normocephalic, no visible scalp lesions, bony palpation unremarkable without stepoffs, parotid and submandibular salivary glands non-tender to palpation and without masses bilaterally  Ears:  Auricles normal in appearance bilaterally, mastoid prominence non-tender, external auditory canals clear bilaterally, tympanic membranes intact bilaterally without evidence of middle ear effusion or masses, normal appearing ossicles  Nose/Sinuses:  External appearance unremarkable, no maxillary or frontal sinus tenderness to palpation bilaterally  Anterior rhinoscopy reveals: An extreme right septal deviation with folding of the quadrangular cartilage leading to a 99% right-sided nasal obstruction with left-sided turbinate hypertrophy and mucosal erythema and edema  Oral Cavity:  Moist mucus membranes, gums and dentition unremarkable, no oral mucosal masses or lesions, floor of mouth soft, tongue mobile without masses or lesions  Oropharynx:  Base of tongue soft and without masses, tonsils bilaterally unremarkable, soft palate mucosa unremarkable, laryngeal mirror exam unrevealing  Neck:  No visible or palpable cervical lesions or lymphadenopathy, thyroid gland is normal in size and symmetry and without masses, normal laryngeal elevation with swallowing  Cardiovascular:  Normal rate and rhythm, no palpable thrills, no jugulovenous distension observed  Respiratory:  Normal respiratory effort without evidence of retractions or use of accessory muscles  Integument:  Normal appearing without observed masses or lesions  Neurologic:  Cranial nerves II-XII intact bilaterally    Psychiatric:  Alert and oriented to time, place and person, normal affect  Procedures  The nasal cavities were decongested with lidocaine and oxymetazoline spray  Bilateral nasal endoscopy was performed as follows: Location: Bilateral nasal cavity  Endoscopy type: Rigid nasal endoscopy  Results: The right middle meatus could not be examined due to obstruction from the septal deflection  On the left there is mucosal friability of the inferior and middle turbinate with no masses exudates or polyps seen  The patient tolerated the procedure well  Assessment:   1  Chronic maxillary sinusitis  CT sinus wo contrast   2  Nasal turbinate hypertrophy     3  Nasal septal deviation         Orders  Orders Placed This Encounter   Procedures    CT sinus wo contrast     Standing Status:   Future     Standing Expiration Date:   1/31/2023     Scheduling Instructions: There is no prep for this study  Please bring your insurance cards, a form of photo ID and a list of your medications with you  Arrive 15 minutes prior to your appointment time to register  On the day of your test, please bring any prior CT or MRI studies of this area with you that were not performed at a [facility]  To schedule this appointment, please contact Central Scheduling at [phone]  Order Specific Question:   What is the patient's sedation requirement? Answer:   No Sedation         Discussion/Plan:    1  It is my impression that he has nasal obstruction and congestion due to a significant septal deviation and turbinate enlargement refractory to appropriate medical management in the past as outlined above  Additionally he has symptoms of both chronic and recurrent acute sinusitis that has been treated with multiple rounds of antibiotics and nasal steroids and nasal saline and also oral steroids in the past   Therefore I recommended a CT sinus    He will follow up after the CT sinus to discuss whether he needs sinus surgery in addition to a septoplasty and bilateral inferior turbinate reduction  Thank you for allowing me to participate in the care of your patient    All

## 2019-02-08 ENCOUNTER — HOSPITAL ENCOUNTER (OUTPATIENT)
Dept: RADIOLOGY | Facility: HOSPITAL | Age: 54
Discharge: HOME | End: 2019-02-08
Attending: OTOLARYNGOLOGY
Payer: COMMERCIAL

## 2019-02-08 DIAGNOSIS — J32.0 CHRONIC MAXILLARY SINUSITIS: ICD-10-CM

## 2019-02-08 PROCEDURE — 70486 CT MAXILLOFACIAL W/O DYE: CPT

## 2019-02-15 DIAGNOSIS — F32.A DEPRESSION, UNSPECIFIED DEPRESSION TYPE: ICD-10-CM

## 2019-02-15 RX ORDER — CITALOPRAM 40 MG/1
40 TABLET ORAL DAILY
Qty: 90 TABLET | Refills: 1 | Status: SHIPPED | OUTPATIENT
Start: 2019-02-15 | End: 2019-08-13 | Stop reason: SDUPTHER

## 2019-02-21 ENCOUNTER — OFFICE VISIT (OUTPATIENT)
Dept: OTOLARYNGOLOGY | Facility: CLINIC | Age: 54
End: 2019-02-21
Payer: COMMERCIAL

## 2019-02-21 VITALS
SYSTOLIC BLOOD PRESSURE: 118 MMHG | WEIGHT: 260 LBS | HEIGHT: 73 IN | BODY MASS INDEX: 34.46 KG/M2 | DIASTOLIC BLOOD PRESSURE: 70 MMHG

## 2019-02-21 DIAGNOSIS — J32.0 CHRONIC MAXILLARY SINUSITIS: Primary | ICD-10-CM

## 2019-02-21 DIAGNOSIS — J34.2 NASAL SEPTAL DEVIATION: ICD-10-CM

## 2019-02-21 DIAGNOSIS — J34.3 NASAL TURBINATE HYPERTROPHY: ICD-10-CM

## 2019-02-21 PROCEDURE — 99213 OFFICE O/P EST LOW 20 MIN: CPT | Performed by: OTOLARYNGOLOGY

## 2019-02-21 NOTE — PROGRESS NOTES
[facility] Otolaryngology Follow up visit      CC: sinusitis      Time interval of problem since last visit:  3 weeks    Pertinent interval elements of the history:  He returns to review his CT sinus  The images were reviewed and they demonstrate a septal deviation and bilateral inferior turbinate hypertrophy along with right maxillary sinus near complete opacification  His symptoms are otherwise unchanged from his prior visit on January 31 documented in that note      Review of any relevant imaging:      Interval Review of systems:  General: no weight change, normal energy  CV: no palpitations or chest pain  Pulm: no shortness of breath    Interval Social History:  Social History     Socioeconomic History    Marital status: /Civil Union     Spouse name: Not on file    Number of children: Not on file    Years of education: Not on file    Highest education level: Not on file   Occupational History    Not on file   Social Needs    Financial resource strain: Not on file    Food insecurity:     Worry: Not on file     Inability: Not on file    Transportation needs:     Medical: Not on file     Non-medical: Not on file   Tobacco Use    Smoking status: Never Smoker    Smokeless tobacco: Never Used   Substance and Sexual Activity    Alcohol use: No    Drug use: No    Sexual activity: Not on file   Lifestyle    Physical activity:     Days per week: Not on file     Minutes per session: Not on file    Stress: Not on file   Relationships    Social connections:     Talks on phone: Not on file     Gets together: Not on file     Attends Buddhism service: Not on file     Active member of club or organization: Not on file     Attends meetings of clubs or organizations: Not on file     Relationship status: Not on file    Intimate partner violence:     Fear of current or ex partner: Not on file     Emotionally abused: Not on file     Physically abused: Not on file     Forced sexual activity: Not on file Other Topics Concern    Not on file   Social History Narrative    Sleeps 6-7 hours a day    Stress at work       Interval Physical Examination:  /70 (BP Location: Left arm, Patient Position: Sitting, Cuff Size: Adult)   Ht 6' 1" (1 854 m)   Wt 118 kg (260 lb)   BMI 34 30 kg/m²   NAD      Interval endoscopy:          Assessment:  1  Chronic maxillary sinusitis     2  Nasal septal deviation     3  Nasal turbinate hypertrophy         Plan:  1  He has CT proven septal deviation turbinate hypertrophy and maxillary sinusitis refractory to appropriate medical management as documented in my prior note  Therefore I recommended septoplasty bilateral inferior turbinate reduction and right maxillary antrostomy  The risks benefits and alternatives of surgery were discussed and his informed consent was obtained  Those risks include bleeding, infection, recurrence, scarring, septal perforation, saddle nose deformity, orbital injury, need for more surgery  He will follow up in the operating room  This visit required 15 minutes, of which more than 50% was spent counseling and coordinating care

## 2019-02-21 NOTE — LETTER
February 21, 2019     Simon Ross MD  [address] [facility]    Patient: Tianna Marino   YOB: 1965   Date of Visit: 2/21/2019       Dear Dr Kati Baker: Thank you for referring Tyshawn Bonds to me for evaluation  Below are my notes for this consultation  If you have questions, please do not hesitate to call me  I look forward to following your patient along with you  Sincerely,        Bill Dee MD        CC: No Recipients  Bill Dee MD  2/21/2019  9:17 AM  Sign at close encounter  [facility] Otolaryngology Follow up visit      CC: sinusitis      Time interval of problem since last visit:  3 weeks    Pertinent interval elements of the history:  He returns to review his CT sinus  The images were reviewed and they demonstrate a septal deviation and bilateral inferior turbinate hypertrophy along with right maxillary sinus near complete opacification  His symptoms are otherwise unchanged from his prior visit on January 31 documented in that note      Review of any relevant imaging:      Interval Review of systems:  General: no weight change, normal energy  CV: no palpitations or chest pain  Pulm: no shortness of breath    Interval Social History:  Social History     Socioeconomic History    Marital status: /Civil Union     Spouse name: Not on file    Number of children: Not on file    Years of education: Not on file    Highest education level: Not on file   Occupational History    Not on file   Social Needs    Financial resource strain: Not on file    Food insecurity:     Worry: Not on file     Inability: Not on file    Transportation needs:     Medical: Not on file     Non-medical: Not on file   Tobacco Use    Smoking status: Never Smoker    Smokeless tobacco: Never Used   Substance and Sexual Activity    Alcohol use: No    Drug use: No    Sexual activity: Not on file   Lifestyle    Physical activity:     Days per week: Not on file     Minutes per session: Not on file    Stress: Not on file   Relationships    Social connections:     Talks on phone: Not on file     Gets together: Not on file     Attends Yarsani service: Not on file     Active member of club or organization: Not on file     Attends meetings of clubs or organizations: Not on file     Relationship status: Not on file    Intimate partner violence:     Fear of current or ex partner: Not on file     Emotionally abused: Not on file     Physically abused: Not on file     Forced sexual activity: Not on file   Other Topics Concern    Not on file   Social History Narrative    Sleeps 6-7 hours a day    Stress at work       Interval Physical Examination:  /70 (BP Location: Left arm, Patient Position: Sitting, Cuff Size: Adult)   Ht 6' 1" (1 854 m)   Wt 118 kg (260 lb)   BMI 34 30 kg/m²    NAD      Interval endoscopy:          Assessment:  1  Chronic maxillary sinusitis     2  Nasal septal deviation     3  Nasal turbinate hypertrophy         Plan:  1  He has CT proven septal deviation turbinate hypertrophy and maxillary sinusitis refractory to appropriate medical management as documented in my prior note  Therefore I recommended septoplasty bilateral inferior turbinate reduction and right maxillary antrostomy  The risks benefits and alternatives of surgery were discussed and his informed consent was obtained  Those risks include bleeding, infection, recurrence, scarring, septal perforation, saddle nose deformity, orbital injury, need for more surgery  He will follow up in the operating room  This visit required 15 minutes, of which more than 50% was spent counseling and coordinating care

## 2019-02-22 PROBLEM — J34.2 NASAL SEPTAL DEVIATION: Status: ACTIVE | Noted: 2019-02-22

## 2019-02-22 PROBLEM — J32.0 CHRONIC MAXILLARY SINUSITIS: Status: ACTIVE | Noted: 2019-02-22

## 2019-02-22 PROBLEM — J34.3 NASAL TURBINATE HYPERTROPHY: Status: ACTIVE | Noted: 2019-02-22

## 2019-03-29 ENCOUNTER — OFFICE VISIT (OUTPATIENT)
Dept: LAB | Facility: HOSPITAL | Age: 54
End: 2019-03-29
Attending: OTOLARYNGOLOGY
Payer: COMMERCIAL

## 2019-03-29 ENCOUNTER — TRANSCRIBE ORDERS (OUTPATIENT)
Dept: ADMINISTRATIVE | Facility: HOSPITAL | Age: 54
End: 2019-03-29

## 2019-03-29 DIAGNOSIS — J32.0 CHRONIC MAXILLARY SINUSITIS: ICD-10-CM

## 2019-03-29 DIAGNOSIS — Z01.818 PRE-OP TESTING: Primary | ICD-10-CM

## 2019-03-29 DIAGNOSIS — J34.3 NASAL TURBINATE HYPERTROPHY: ICD-10-CM

## 2019-03-29 DIAGNOSIS — J34.2 NASAL SEPTAL DEVIATION: ICD-10-CM

## 2019-03-29 DIAGNOSIS — Z01.818 PRE-OP TESTING: ICD-10-CM

## 2019-03-29 LAB
ANION GAP SERPL CALCULATED.3IONS-SCNC: 10 MMOL/L (ref 4–13)
BASOPHILS # BLD AUTO: 0.05 THOUSANDS/ΜL (ref 0–0.1)
BASOPHILS NFR BLD AUTO: 1 % (ref 0–1)
BUN SERPL-MCNC: 12 MG/DL (ref 5–25)
CALCIUM SERPL-MCNC: 9.4 MG/DL (ref 8.3–10.1)
CHLORIDE SERPL-SCNC: 105 MMOL/L (ref 100–108)
CO2 SERPL-SCNC: 26 MMOL/L (ref 21–32)
CREAT SERPL-MCNC: 0.93 MG/DL (ref 0.6–1.3)
EOSINOPHIL # BLD AUTO: 0.22 THOUSAND/ΜL (ref 0–0.61)
EOSINOPHIL NFR BLD AUTO: 3 % (ref 0–6)
ERYTHROCYTE [DISTWIDTH] IN BLOOD BY AUTOMATED COUNT: 12.9 % (ref 11.6–15.1)
GFR SERPL CREATININE-BSD FRML MDRD: 94 ML/MIN/1.73SQ M
GLUCOSE P FAST SERPL-MCNC: 84 MG/DL (ref 65–99)
HCT VFR BLD AUTO: 45.2 % (ref 36.5–49.3)
HGB BLD-MCNC: 15 G/DL (ref 12–17)
IMM GRANULOCYTES # BLD AUTO: 0.06 THOUSAND/UL (ref 0–0.2)
IMM GRANULOCYTES NFR BLD AUTO: 1 % (ref 0–2)
LYMPHOCYTES # BLD AUTO: 2.65 THOUSANDS/ΜL (ref 0.6–4.47)
LYMPHOCYTES NFR BLD AUTO: 31 % (ref 14–44)
MCH RBC QN AUTO: 29.4 PG (ref 26.8–34.3)
MCHC RBC AUTO-ENTMCNC: 33.2 G/DL (ref 31.4–37.4)
MCV RBC AUTO: 89 FL (ref 82–98)
MONOCYTES # BLD AUTO: 0.63 THOUSAND/ΜL (ref 0.17–1.22)
MONOCYTES NFR BLD AUTO: 7 % (ref 4–12)
NEUTROPHILS # BLD AUTO: 4.97 THOUSANDS/ΜL (ref 1.85–7.62)
NEUTS SEG NFR BLD AUTO: 57 % (ref 43–75)
NRBC BLD AUTO-RTO: 0 /100 WBCS
PLATELET # BLD AUTO: 336 THOUSANDS/UL (ref 149–390)
PMV BLD AUTO: 8.7 FL (ref 8.9–12.7)
POTASSIUM SERPL-SCNC: 3.6 MMOL/L (ref 3.5–5.3)
RBC # BLD AUTO: 5.1 MILLION/UL (ref 3.88–5.62)
SODIUM SERPL-SCNC: 141 MMOL/L (ref 136–145)
WBC # BLD AUTO: 8.58 THOUSAND/UL (ref 4.31–10.16)

## 2019-03-29 PROCEDURE — 85025 COMPLETE CBC W/AUTO DIFF WBC: CPT

## 2019-03-29 PROCEDURE — 36415 COLL VENOUS BLD VENIPUNCTURE: CPT

## 2019-03-29 PROCEDURE — 80048 BASIC METABOLIC PNL TOTAL CA: CPT

## 2019-03-29 PROCEDURE — 93005 ELECTROCARDIOGRAM TRACING: CPT

## 2019-04-02 LAB
ATRIAL RATE: 54 BPM
P AXIS: 37 DEGREES
PR INTERVAL: 180 MS
QRS AXIS: -5 DEGREES
QRSD INTERVAL: 114 MS
QT INTERVAL: 486 MS
QTC INTERVAL: 460 MS
T WAVE AXIS: -13 DEGREES
VENTRICULAR RATE: 54 BPM

## 2019-04-02 PROCEDURE — 93010 ELECTROCARDIOGRAM REPORT: CPT | Performed by: INTERNAL MEDICINE

## 2019-04-03 ENCOUNTER — ANESTHESIA EVENT (OUTPATIENT)
Dept: PERIOP | Facility: HOSPITAL | Age: 54
End: 2019-04-03
Payer: COMMERCIAL

## 2019-04-04 ENCOUNTER — HOSPITAL ENCOUNTER (OUTPATIENT)
Facility: HOSPITAL | Age: 54
Setting detail: OUTPATIENT SURGERY
Discharge: HOME | End: 2019-04-04
Attending: OTOLARYNGOLOGY | Admitting: OTOLARYNGOLOGY
Payer: COMMERCIAL

## 2019-04-04 ENCOUNTER — ANESTHESIA (OUTPATIENT)
Dept: PERIOP | Facility: HOSPITAL | Age: 54
End: 2019-04-04
Payer: COMMERCIAL

## 2019-04-04 VITALS
BODY MASS INDEX: 34.46 KG/M2 | WEIGHT: 260 LBS | HEART RATE: 83 BPM | OXYGEN SATURATION: 95 % | RESPIRATION RATE: 18 BRPM | TEMPERATURE: 97.9 F | SYSTOLIC BLOOD PRESSURE: 124 MMHG | DIASTOLIC BLOOD PRESSURE: 65 MMHG | HEIGHT: 73 IN

## 2019-04-04 DIAGNOSIS — J34.2 NASAL SEPTAL DEVIATION: ICD-10-CM

## 2019-04-04 DIAGNOSIS — J32.0 CHRONIC MAXILLARY SINUSITIS: Primary | ICD-10-CM

## 2019-04-04 DIAGNOSIS — J34.3 NASAL TURBINATE HYPERTROPHY: ICD-10-CM

## 2019-04-04 PROCEDURE — 30520 REPAIR OF NASAL SEPTUM: CPT | Performed by: OTOLARYNGOLOGY

## 2019-04-04 PROCEDURE — 31267 ENDOSCOPY MAXILLARY SINUS: CPT | Performed by: OTOLARYNGOLOGY

## 2019-04-04 PROCEDURE — 30140 RESECT INFERIOR TURBINATE: CPT | Performed by: OTOLARYNGOLOGY

## 2019-04-04 RX ORDER — SODIUM CHLORIDE 9 MG/ML
125 INJECTION, SOLUTION INTRAVENOUS CONTINUOUS
Status: DISCONTINUED | OUTPATIENT
Start: 2019-04-04 | End: 2019-04-04 | Stop reason: HOSPADM

## 2019-04-04 RX ORDER — GLYCOPYRROLATE 0.2 MG/ML
INJECTION INTRAMUSCULAR; INTRAVENOUS AS NEEDED
Status: DISCONTINUED | OUTPATIENT
Start: 2019-04-04 | End: 2019-04-04 | Stop reason: SURG

## 2019-04-04 RX ORDER — FENTANYL CITRATE 50 UG/ML
INJECTION, SOLUTION INTRAMUSCULAR; INTRAVENOUS AS NEEDED
Status: DISCONTINUED | OUTPATIENT
Start: 2019-04-04 | End: 2019-04-04 | Stop reason: SURG

## 2019-04-04 RX ORDER — ONDANSETRON 2 MG/ML
INJECTION INTRAMUSCULAR; INTRAVENOUS AS NEEDED
Status: DISCONTINUED | OUTPATIENT
Start: 2019-04-04 | End: 2019-04-04 | Stop reason: SURG

## 2019-04-04 RX ORDER — SODIUM CHLORIDE 9 MG/ML
INJECTION, SOLUTION INTRAVENOUS CONTINUOUS PRN
Status: DISCONTINUED | OUTPATIENT
Start: 2019-04-04 | End: 2019-04-04 | Stop reason: SURG

## 2019-04-04 RX ORDER — MAGNESIUM HYDROXIDE 1200 MG/15ML
LIQUID ORAL AS NEEDED
Status: DISCONTINUED | OUTPATIENT
Start: 2019-04-04 | End: 2019-04-04 | Stop reason: HOSPADM

## 2019-04-04 RX ORDER — LIDOCAINE HYDROCHLORIDE 10 MG/ML
INJECTION, SOLUTION INFILTRATION; PERINEURAL AS NEEDED
Status: DISCONTINUED | OUTPATIENT
Start: 2019-04-04 | End: 2019-04-04 | Stop reason: SURG

## 2019-04-04 RX ORDER — CLINDAMYCIN PHOSPHATE 900 MG/50ML
900 INJECTION INTRAVENOUS EVERY 8 HOURS
Status: DISCONTINUED | OUTPATIENT
Start: 2019-04-04 | End: 2019-04-04 | Stop reason: HOSPADM

## 2019-04-04 RX ORDER — DEXAMETHASONE SODIUM PHOSPHATE 10 MG/ML
INJECTION, SOLUTION INTRAMUSCULAR; INTRAVENOUS AS NEEDED
Status: DISCONTINUED | OUTPATIENT
Start: 2019-04-04 | End: 2019-04-04 | Stop reason: SURG

## 2019-04-04 RX ORDER — ROCURONIUM BROMIDE 10 MG/ML
INJECTION, SOLUTION INTRAVENOUS AS NEEDED
Status: DISCONTINUED | OUTPATIENT
Start: 2019-04-04 | End: 2019-04-04 | Stop reason: SURG

## 2019-04-04 RX ORDER — HYDROCODONE BITARTRATE AND ACETAMINOPHEN 5; 325 MG/1; MG/1
1 TABLET ORAL EVERY 6 HOURS PRN
Status: DISCONTINUED | OUTPATIENT
Start: 2019-04-04 | End: 2019-04-04 | Stop reason: HOSPADM

## 2019-04-04 RX ORDER — LIDOCAINE HYDROCHLORIDE AND EPINEPHRINE 10; 10 MG/ML; UG/ML
INJECTION, SOLUTION INFILTRATION; PERINEURAL AS NEEDED
Status: DISCONTINUED | OUTPATIENT
Start: 2019-04-04 | End: 2019-04-04 | Stop reason: HOSPADM

## 2019-04-04 RX ORDER — FENTANYL CITRATE/PF 50 MCG/ML
50 SYRINGE (ML) INJECTION
Status: DISCONTINUED | OUTPATIENT
Start: 2019-04-04 | End: 2019-04-04 | Stop reason: HOSPADM

## 2019-04-04 RX ORDER — MIDAZOLAM HYDROCHLORIDE 1 MG/ML
INJECTION INTRAMUSCULAR; INTRAVENOUS AS NEEDED
Status: DISCONTINUED | OUTPATIENT
Start: 2019-04-04 | End: 2019-04-04 | Stop reason: SURG

## 2019-04-04 RX ORDER — NEOSTIGMINE METHYLSULFATE 1 MG/ML
INJECTION INTRAVENOUS AS NEEDED
Status: DISCONTINUED | OUTPATIENT
Start: 2019-04-04 | End: 2019-04-04 | Stop reason: SURG

## 2019-04-04 RX ORDER — HYDROCODONE BITARTRATE AND ACETAMINOPHEN 5; 325 MG/1; MG/1
1 TABLET ORAL EVERY 6 HOURS PRN
Qty: 15 TABLET | Refills: 0 | Status: SHIPPED | OUTPATIENT
Start: 2019-04-04 | End: 2019-04-14

## 2019-04-04 RX ORDER — EPHEDRINE SULFATE 50 MG/ML
INJECTION INTRAVENOUS AS NEEDED
Status: DISCONTINUED | OUTPATIENT
Start: 2019-04-04 | End: 2019-04-04 | Stop reason: SURG

## 2019-04-04 RX ORDER — PROPOFOL 10 MG/ML
INJECTION, EMULSION INTRAVENOUS AS NEEDED
Status: DISCONTINUED | OUTPATIENT
Start: 2019-04-04 | End: 2019-04-04 | Stop reason: SURG

## 2019-04-04 RX ORDER — DOXYCYCLINE 100 MG/1
100 TABLET ORAL 2 TIMES DAILY
Qty: 20 TABLET | Refills: 0 | Status: SHIPPED | OUTPATIENT
Start: 2019-04-04 | End: 2019-04-14

## 2019-04-04 RX ORDER — LABETALOL 20 MG/4 ML (5 MG/ML) INTRAVENOUS SYRINGE
AS NEEDED
Status: DISCONTINUED | OUTPATIENT
Start: 2019-04-04 | End: 2019-04-04 | Stop reason: SURG

## 2019-04-04 RX ORDER — ONDANSETRON 2 MG/ML
4 INJECTION INTRAMUSCULAR; INTRAVENOUS ONCE AS NEEDED
Status: DISCONTINUED | OUTPATIENT
Start: 2019-04-04 | End: 2019-04-04 | Stop reason: HOSPADM

## 2019-04-04 RX ADMIN — FENTANYL CITRATE 100 MCG: 50 INJECTION, SOLUTION INTRAMUSCULAR; INTRAVENOUS at 14:31

## 2019-04-04 RX ADMIN — ROCURONIUM BROMIDE 10 MG: 10 INJECTION, SOLUTION INTRAVENOUS at 15:21

## 2019-04-04 RX ADMIN — PHENYLEPHRINE HYDROCHLORIDE 100 MCG: 10 INJECTION INTRAVENOUS at 14:49

## 2019-04-04 RX ADMIN — LABETALOL 20 MG/4 ML (5 MG/ML) INTRAVENOUS SYRINGE 10 MG: at 14:45

## 2019-04-04 RX ADMIN — GLYCOPYRROLATE 0.4 MG: 0.2 INJECTION, SOLUTION INTRAMUSCULAR; INTRAVENOUS at 15:56

## 2019-04-04 RX ADMIN — PHENYLEPHRINE HYDROCHLORIDE 100 MCG: 10 INJECTION INTRAVENOUS at 15:05

## 2019-04-04 RX ADMIN — EPHEDRINE SULFATE 5 MG: 50 INJECTION, SOLUTION INTRAVENOUS at 15:39

## 2019-04-04 RX ADMIN — SODIUM CHLORIDE: 0.9 INJECTION, SOLUTION INTRAVENOUS at 12:40

## 2019-04-04 RX ADMIN — DEXAMETHASONE SODIUM PHOSPHATE 10 MG: 10 INJECTION, SOLUTION INTRAMUSCULAR; INTRAVENOUS at 14:35

## 2019-04-04 RX ADMIN — PROPOFOL 300 MG: 10 INJECTION, EMULSION INTRAVENOUS at 14:31

## 2019-04-04 RX ADMIN — PHENYLEPHRINE HYDROCHLORIDE 100 MCG: 10 INJECTION INTRAVENOUS at 15:20

## 2019-04-04 RX ADMIN — ROCURONIUM BROMIDE 50 MG: 10 INJECTION, SOLUTION INTRAVENOUS at 14:31

## 2019-04-04 RX ADMIN — EPHEDRINE SULFATE 10 MG: 50 INJECTION, SOLUTION INTRAVENOUS at 14:49

## 2019-04-04 RX ADMIN — PHENYLEPHRINE HYDROCHLORIDE 100 MCG: 10 INJECTION INTRAVENOUS at 15:12

## 2019-04-04 RX ADMIN — EPHEDRINE SULFATE 5 MG: 50 INJECTION, SOLUTION INTRAVENOUS at 15:12

## 2019-04-04 RX ADMIN — MIDAZOLAM HYDROCHLORIDE 2 MG: 1 INJECTION, SOLUTION INTRAMUSCULAR; INTRAVENOUS at 14:25

## 2019-04-04 RX ADMIN — EPHEDRINE SULFATE 10 MG: 50 INJECTION, SOLUTION INTRAVENOUS at 14:45

## 2019-04-04 RX ADMIN — ONDANSETRON 4 MG: 2 INJECTION INTRAMUSCULAR; INTRAVENOUS at 14:35

## 2019-04-04 RX ADMIN — CLINDAMYCIN PHOSPHATE 900 MG: 18 INJECTION, SOLUTION INTRAMUSCULAR; INTRAVENOUS at 14:25

## 2019-04-04 RX ADMIN — NEOSTIGMINE METHYLSULFATE 3 MG: 1 INJECTION INTRAVENOUS at 15:54

## 2019-04-04 RX ADMIN — PHENYLEPHRINE HYDROCHLORIDE 100 MCG: 10 INJECTION INTRAVENOUS at 15:39

## 2019-04-04 RX ADMIN — LIDOCAINE HYDROCHLORIDE 50 MG: 10 INJECTION, SOLUTION INFILTRATION; PERINEURAL at 14:31

## 2019-04-11 ENCOUNTER — OFFICE VISIT (OUTPATIENT)
Dept: OTOLARYNGOLOGY | Facility: CLINIC | Age: 54
End: 2019-04-11
Payer: COMMERCIAL

## 2019-04-11 VITALS
WEIGHT: 258 LBS | HEIGHT: 73 IN | SYSTOLIC BLOOD PRESSURE: 128 MMHG | BODY MASS INDEX: 34.19 KG/M2 | DIASTOLIC BLOOD PRESSURE: 82 MMHG

## 2019-04-11 DIAGNOSIS — Z98.890 STATUS POST NASAL SEPTOPLASTY: ICD-10-CM

## 2019-04-11 DIAGNOSIS — J32.0 CHRONIC MAXILLARY SINUSITIS: Primary | ICD-10-CM

## 2019-04-11 PROCEDURE — 99024 POSTOP FOLLOW-UP VISIT: CPT | Performed by: OTOLARYNGOLOGY

## 2019-04-11 PROCEDURE — 31231 NASAL ENDOSCOPY DX: CPT | Performed by: OTOLARYNGOLOGY

## 2019-04-11 RX ORDER — DOXYCYCLINE HYCLATE 100 MG
TABLET ORAL
Refills: 0 | COMMUNITY
Start: 2019-04-04 | End: 2020-07-31 | Stop reason: ALTCHOICE

## 2019-05-09 ENCOUNTER — OFFICE VISIT (OUTPATIENT)
Dept: OTOLARYNGOLOGY | Facility: CLINIC | Age: 54
End: 2019-05-09

## 2019-05-09 VITALS
DIASTOLIC BLOOD PRESSURE: 88 MMHG | HEIGHT: 73 IN | BODY MASS INDEX: 34.85 KG/M2 | WEIGHT: 263 LBS | SYSTOLIC BLOOD PRESSURE: 120 MMHG

## 2019-05-09 DIAGNOSIS — Z98.890 STATUS POST NASAL SEPTOPLASTY: Primary | ICD-10-CM

## 2019-05-09 PROCEDURE — 99024 POSTOP FOLLOW-UP VISIT: CPT | Performed by: OTOLARYNGOLOGY

## 2019-06-12 ENCOUNTER — TELEPHONE (OUTPATIENT)
Dept: CARDIOLOGY CLINIC | Facility: CLINIC | Age: 54
End: 2019-06-12

## 2019-06-20 ENCOUNTER — OFFICE VISIT (OUTPATIENT)
Dept: OTOLARYNGOLOGY | Facility: CLINIC | Age: 54
End: 2019-06-20

## 2019-06-20 VITALS
DIASTOLIC BLOOD PRESSURE: 88 MMHG | HEIGHT: 73 IN | SYSTOLIC BLOOD PRESSURE: 124 MMHG | BODY MASS INDEX: 35.25 KG/M2 | WEIGHT: 266 LBS

## 2019-06-20 DIAGNOSIS — J01.00 ACUTE NON-RECURRENT MAXILLARY SINUSITIS: Primary | ICD-10-CM

## 2019-06-20 PROCEDURE — 99024 POSTOP FOLLOW-UP VISIT: CPT | Performed by: OTOLARYNGOLOGY

## 2019-06-20 RX ORDER — CLARITHROMYCIN 500 MG/1
500 TABLET, COATED ORAL EVERY 12 HOURS SCHEDULED
Qty: 14 TABLET | Refills: 0 | Status: SHIPPED | OUTPATIENT
Start: 2019-06-20 | End: 2019-06-27

## 2019-06-20 RX ORDER — HYDROCHLOROTHIAZIDE 12.5 MG/1
12.5 TABLET ORAL DAILY
Refills: 0 | COMMUNITY
Start: 2019-06-17 | End: 2019-08-26 | Stop reason: SDUPTHER

## 2019-06-24 DIAGNOSIS — I10 HYPERTENSION, UNSPECIFIED TYPE: ICD-10-CM

## 2019-06-24 RX ORDER — LOSARTAN POTASSIUM AND HYDROCHLOROTHIAZIDE 12.5; 5 MG/1; MG/1
1 TABLET ORAL DAILY
Qty: 90 TABLET | Refills: 3 | Status: SHIPPED | OUTPATIENT
Start: 2019-06-24 | End: 2019-12-27 | Stop reason: SDUPTHER

## 2019-08-13 DIAGNOSIS — F32.A DEPRESSION, UNSPECIFIED DEPRESSION TYPE: ICD-10-CM

## 2019-08-13 RX ORDER — CITALOPRAM 40 MG/1
40 TABLET ORAL
Qty: 30 TABLET | Refills: 0 | Status: SHIPPED | OUTPATIENT
Start: 2019-08-13 | End: 2019-08-26 | Stop reason: SDUPTHER

## 2019-08-20 RX ORDER — LOSARTAN POTASSIUM 50 MG/1
50 TABLET ORAL DAILY
Refills: 3 | COMMUNITY
Start: 2019-07-15 | End: 2019-08-26 | Stop reason: SDUPTHER

## 2019-08-26 ENCOUNTER — OFFICE VISIT (OUTPATIENT)
Dept: FAMILY MEDICINE CLINIC | Facility: CLINIC | Age: 54
End: 2019-08-26
Payer: COMMERCIAL

## 2019-08-26 VITALS
HEART RATE: 60 BPM | TEMPERATURE: 97.8 F | WEIGHT: 268.2 LBS | DIASTOLIC BLOOD PRESSURE: 80 MMHG | BODY MASS INDEX: 35.54 KG/M2 | HEIGHT: 73 IN | SYSTOLIC BLOOD PRESSURE: 120 MMHG | RESPIRATION RATE: 16 BRPM

## 2019-08-26 DIAGNOSIS — Z12.11 ENCOUNTER FOR COLORECTAL CANCER SCREENING: ICD-10-CM

## 2019-08-26 DIAGNOSIS — E78.5 HYPERLIPIDEMIA, UNSPECIFIED HYPERLIPIDEMIA TYPE: Primary | ICD-10-CM

## 2019-08-26 DIAGNOSIS — Z12.12 ENCOUNTER FOR COLORECTAL CANCER SCREENING: ICD-10-CM

## 2019-08-26 DIAGNOSIS — E55.9 VITAMIN D DEFICIENCY: ICD-10-CM

## 2019-08-26 DIAGNOSIS — F32.A DEPRESSION, UNSPECIFIED DEPRESSION TYPE: ICD-10-CM

## 2019-08-26 DIAGNOSIS — I10 BENIGN ESSENTIAL HYPERTENSION: ICD-10-CM

## 2019-08-26 PROCEDURE — 3079F DIAST BP 80-89 MM HG: CPT | Performed by: FAMILY MEDICINE

## 2019-08-26 PROCEDURE — 3074F SYST BP LT 130 MM HG: CPT | Performed by: FAMILY MEDICINE

## 2019-08-26 PROCEDURE — 3008F BODY MASS INDEX DOCD: CPT | Performed by: FAMILY MEDICINE

## 2019-08-26 PROCEDURE — 99214 OFFICE O/P EST MOD 30 MIN: CPT | Performed by: FAMILY MEDICINE

## 2019-08-26 RX ORDER — CITALOPRAM 40 MG/1
40 TABLET ORAL
Qty: 30 TABLET | Refills: 0 | Status: SHIPPED | OUTPATIENT
Start: 2019-08-26 | End: 2019-10-21 | Stop reason: SDUPTHER

## 2019-08-27 NOTE — PROGRESS NOTES
Assessment/Plan:       Diagnoses and all orders for this visit:    Hyperlipidemia, unspecified hyperlipidemia type  -     Lipid Panel with Direct LDL reflex; Future  -     Comprehensive metabolic panel; Future  Will repeat lipid panel along with lfts to monitor as patient currently on zetia and fish oil  Depression, unspecified depression type  -     citalopram (CeleXA) 40 mg tablet; Take 1 tablet (40 mg total) by mouth daily at bedtime  Refilled medication  Advised if any changes in mentation, patient should be evaluated  Encounter for colorectal cancer screening  -     Ambulatory referral to Gastroenterology; Future  Referral for colonoscopy given  Vitamin D deficiency  -     Vitamin D 25 hydroxy; Future  Will repeat as previous was 24 and currently on supplementation 4000 IU daily  Benign essential hypertension    BP currently stable  Patient advised to continue with lorsartan-hctz  Subjective:      Patient ID: Izora Lennox is a 48 y o  male  49 y/o male with hx of hypertension and HLD presents for follow up for chronic conditions  Patient currently on zetia and fish oil pills for hyperlipidemia  TA from previous lipid panel   Patient also has hypertension which patient states is well controlled with lorsartan-hctz  Does have a deficiency of vitamin D and currently is on 4000 IU of vitamin D  Patient also needs refill on celexa and states he is doing well on the medication  6 years ago he had a panic attack and things were going on that made it horrible for him  Since then his life has been really good, healthy, and feels good being on the medication   He denies depressed thoughts, sucidal/homicidal ideas      The following portions of the patient's history were reviewed and updated as appropriate: allergies, current medications, past family history, past medical history, past social history, past surgical history and problem list     Review of Systems   Constitutional: Negative for fever  HENT: Negative for ear discharge, ear pain and sore throat  Respiratory: Negative for cough and shortness of breath  Cardiovascular: Negative for chest pain and leg swelling  Gastrointestinal: Negative for abdominal pain, constipation, nausea and vomiting  Genitourinary: Negative for dysuria  Musculoskeletal: Negative for back pain  Skin: Negative for color change and rash  Neurological: Negative for dizziness, weakness, light-headedness and headaches  Psychiatric/Behavioral: Negative for agitation  Objective:      /80 (BP Location: Right arm, Patient Position: Sitting, Cuff Size: Large)   Pulse 60   Temp 97 8 °F (36 6 °C)   Resp 16   Ht 6' 1" (1 854 m)   Wt 122 kg (268 lb 3 2 oz)   BMI 35 38 kg/m²          Physical Exam   Constitutional: He is oriented to person, place, and time  He appears well-developed and well-nourished  HENT:   Head: Normocephalic and atraumatic  Right Ear: External ear normal    Left Ear: External ear normal    Nose: Nose normal    Mouth/Throat: Oropharynx is clear and moist  No oropharyngeal exudate  Eyes: EOM are normal  Right eye exhibits no discharge  Left eye exhibits no discharge  Neck: Normal range of motion  Neck supple  Cardiovascular: Normal rate, regular rhythm, normal heart sounds and intact distal pulses  Pulmonary/Chest: Effort normal and breath sounds normal  He has no wheezes  Abdominal: Soft  Bowel sounds are normal  There is no tenderness  Musculoskeletal: He exhibits no edema or tenderness  Neurological: He is alert and oriented to person, place, and time  Skin: Skin is warm  No rash noted  No erythema  Psychiatric: He has a normal mood and affect  His behavior is normal    Vitals reviewed

## 2019-09-13 ENCOUNTER — OFFICE VISIT (OUTPATIENT)
Dept: AUDIOLOGY | Facility: CLINIC | Age: 54
End: 2019-09-13
Payer: COMMERCIAL

## 2019-09-13 DIAGNOSIS — H90.3 SENSORY HEARING LOSS, BILATERAL: Primary | ICD-10-CM

## 2019-09-13 PROCEDURE — 92557 COMPREHENSIVE HEARING TEST: CPT | Performed by: AUDIOLOGIST

## 2019-09-13 PROCEDURE — 92567 TYMPANOMETRY: CPT | Performed by: AUDIOLOGIST

## 2019-09-13 NOTE — PROGRESS NOTES
HEARING EVALUATION    Name:  Vidal Lee  :  1965  Age:  48 y o  Date of Evaluation: 19     History: Known Hearing Loss binaurally  Reason for visit: Vidal Lee is being seen today at the request of Dr Bradly Gomes for an evaluation of hearing  Patient reports subjective gradual decreases to his hearing sensitivities that initially began several years ago  Patient notes he does well in one-on-one quiet conversations but when in the presence of background noise or when those whom are speaking to him turn their backs he struggles understanding  Patient also reports he will watch the television at a much louder volume than others would prefer  He often will have his coworkers or family members repeat themselves  Patient denies any recent ear infections and/or ear surgeries  He denies any head trauma/whiplash/concussions  Denied any family history of hearing loss  Patient does report having his hearing tested twice within the last few years but was told he didn't need hearing aids at that time; last hearing test was 2 years ago  Patient does have a significant history for loud noise exposure as he worked for 20-30 years near and around [employer]; does report utilizing hearing protection  Today patient denies any otalgia, tinnitus, and/or dizziness  Patient does report bilateral aural fullness that is constant  EVALUATION:    Otoscopic Evaluation:   Right Ear: Clear and healthy ear canal and tympanic membrane   Left Ear: Clear and healthy ear canal and tympanic membrane    Tympanometry:   Right: Type A - normal middle ear pressure and compliance   Left: Type A - normal middle ear pressure and compliance    Audiogram Results:  Pure tone testing revealed a normal sloping to profound sensorineural hearing loss bilaterally  SRT and PTA are in agreement indicating good test reliability   Word recognition was completed utilizing the W-22 word list and revealed 88% when presented at 80 dB (MCL) in the right ear and 92% when presented at 85 dB (MCL) in the left ear  *see attached audiogram      RECOMMENDATIONS:  Annual hearing eval, Return to [facility]  for F/U, Hearing Aid Evaluation and Copy to Patient/Caregiver    PATIENT EDUCATION:   Discussed results and recommendations with the patient at length  Reviewed hearing loss utilizing the familiar sounds audiogram and a copy of this was provided to him to share with his wife  Patient was made aware he is a hearing aid candidate for both ears; patient voiced interest in pursuing this  Briefly discussed/introduced the types, size, and phone connectivity with him  Patient is scheduled to return for an HAE  He was encouraged should any issues/changes arise prior to his HAE appointment to contact our clinic         Beverley Hernandez , CCC-A  Clinical Audiologist

## 2019-10-21 DIAGNOSIS — F32.A DEPRESSION, UNSPECIFIED DEPRESSION TYPE: ICD-10-CM

## 2019-10-21 RX ORDER — CITALOPRAM 40 MG/1
40 TABLET ORAL
Qty: 90 TABLET | Refills: 0 | Status: SHIPPED | OUTPATIENT
Start: 2019-10-21 | End: 2020-01-20 | Stop reason: SDUPTHER

## 2019-10-23 ENCOUNTER — OFFICE VISIT (OUTPATIENT)
Dept: AUDIOLOGY | Facility: CLINIC | Age: 54
End: 2019-10-23
Payer: COMMERCIAL

## 2019-10-23 DIAGNOSIS — H90.3 SENSORY HEARING LOSS, BILATERAL: Primary | ICD-10-CM

## 2019-10-23 PROCEDURE — V5261 HEARING AID, DIGIT, BIN, BTE: HCPCS | Performed by: AUDIOLOGIST

## 2019-10-23 NOTE — PROGRESS NOTES
Hearing Aid Fitting    Name:  Lalit Alvarez  :  1965  Age:  48 y o  Date of Evaluation: 10/23/19     Lalit Alvarez is being see today to be fit with new hearing aids  Patient is fit with Oticon OPN S 2 minirite R hearing aid(s)  Right serial number [id number]  Left serial number [id number]  :  [id number]   Warranty date: 22  (Loss/Damage and repair)   same, repair only     Ear mold Battery  Dome   Right N/A RC # 2 85 w/ lock 10 mm DB   Left N/A RC # 2 85 w/lock  8 mm DB     The hearing aid(s) were adjusted based on the patient's most recent audiogram and patient comfort and demo settings  Patient noted good sound quality, and was initially satisfied with the fitting  He was set to step 1 - 2 in 4 months time  Sound navigator to 0 / -5  VC's on and together  Care and cleaning of the hearing aids was reviewed  Domes, wax guards,  and user manual were reviewed with the patient  He was directed to the pages discussing pairing of his IPhone to the aids  Insertion and removal of the hearing aids was done  The patient practiced insertion and removal of the devices in the office, they demonstrated excellent ability to manipulate the hearing aids  Telephone use was reviewed with the patient  The patient expressed satisfaction with the hearing aids  Recommendation:   Follow up in three weeks, scheduled for 19          Beverley Skelton , CCC-A, [place]# [id number], Hearing Aid Dispenser, [place]# [id number]  Clinical Audiologist

## 2019-10-23 NOTE — LETTER
2019     Yamila Troncoso MD  [address] [facility]    Patient: Renetta Servin   YOB: 1965   Date of Visit: 10/23/2019       Dear Dr James Rodriguez:    Bobby Alvarenga was seen today for a new hearing aid fitting  Below are my notes for this consultation  If you have questions, please do not hesitate to call me  I look forward to following your patient along with you  Sincerely,        Beverley Ellsworth , CCC/A  Clinical Audiologist   [place]  [id number]        CC: No Recipients  Wing Saenz  10/23/2019 10:06 AM  Sign at close encounter       Hearing Aid Fitting    Name:  Renetta Servin  :  1965  Age:  48 y o  Date of Evaluation: 10/23/19     Renetta Servin is being see today to be fit with new hearing aids  Patient is fit with OtRunscope OPN S 2 minirite R hearing aid(s)  Right serial number [id number]  Left serial number [id number]  :  [id number]   Warranty date: 22  (Loss/Damage and repair)   same, repair only     Ear mold Battery  Dome   Right N/A RC # 2 85 w/ lock 10 mm DB   Left N/A RC # 2 85 w/lock  8 mm DB     The hearing aid(s) were adjusted based on the patient's most recent audiogram and patient comfort and demo settings  Patient noted good sound quality, and was initially satisfied with the fitting  He was set to step 1 - 2 in 4 months time  Sound navigator to 0 / -5  VC's on and together  Care and cleaning of the hearing aids was reviewed  Domes, wax guards,  and user manual were reviewed with the patient  He was directed to the pages discussing pairing of his IPhone to the aids  Insertion and removal of the hearing aids was done  The patient practiced insertion and removal of the devices in the office, they demonstrated excellent ability to manipulate the hearing aids  Telephone use was reviewed with the patient  The patient expressed satisfaction with the hearing aids        Recommendation:   Follow up in three weeks, scheduled for 11/13/19          Beverley Cuevas , CCC-A, [place]# [id number], Hearing Aid Dispenser, [place]# [id number]  Clinical Audiologist

## 2019-11-11 DIAGNOSIS — I25.10 CAD IN NATIVE ARTERY: ICD-10-CM

## 2019-11-11 RX ORDER — ATENOLOL 25 MG/1
25 TABLET ORAL
Qty: 90 TABLET | Refills: 0 | Status: SHIPPED | OUTPATIENT
Start: 2019-11-11 | End: 2019-12-27 | Stop reason: SDUPTHER

## 2019-11-13 ENCOUNTER — OFFICE VISIT (OUTPATIENT)
Dept: AUDIOLOGY | Facility: CLINIC | Age: 54
End: 2019-11-13

## 2019-11-13 DIAGNOSIS — H90.3 SENSORY HEARING LOSS, BILATERAL: Primary | ICD-10-CM

## 2019-11-13 NOTE — PROGRESS NOTES
Hearing Aid Visit:    Name:  Rajesh Mendoza  :  1965  Age:  48 y o  Date of Evaluation: 19     Rajesh Mendoza is being seen for an initial hearing aid visit post fitting  Patient is fit with Oticon OPN S 2 minirite R hearing aid(s)  Right serial number [id number]  Left serial number [id number]  Warranty date: 22 (Loss/Damage and repair)  Mini Machado [id number]  Warranty:  22    Patient reports that he is acclimating to the devices and that he is ready for more volume  No other issues with sound quality or fit are reported  Action:  1  Went to step 2  Auto adapt to step 3 in 4 months  2  He has not connected to his Iphone and said that he probably would not do so  3  He has not needed to use the VC, they are connected together  Recommendations:   1  RTO on  20 for first 5 month service        Beverley Polanco , CCC-A, [place]# [id number], Hearing Aid Dispenser, [place]# [id number]  Clinical Audiologist

## 2019-12-27 ENCOUNTER — OFFICE VISIT (OUTPATIENT)
Dept: CARDIOLOGY CLINIC | Facility: CLINIC | Age: 54
End: 2019-12-27
Payer: COMMERCIAL

## 2019-12-27 VITALS
OXYGEN SATURATION: 97 % | HEIGHT: 73 IN | SYSTOLIC BLOOD PRESSURE: 120 MMHG | HEART RATE: 54 BPM | DIASTOLIC BLOOD PRESSURE: 86 MMHG | BODY MASS INDEX: 35.98 KG/M2 | WEIGHT: 271.5 LBS

## 2019-12-27 DIAGNOSIS — I10 HYPERTENSION, UNSPECIFIED TYPE: ICD-10-CM

## 2019-12-27 DIAGNOSIS — E78.5 HYPERLIPIDEMIA, UNSPECIFIED HYPERLIPIDEMIA TYPE: ICD-10-CM

## 2019-12-27 DIAGNOSIS — I10 BENIGN ESSENTIAL HYPERTENSION: Primary | ICD-10-CM

## 2019-12-27 DIAGNOSIS — I25.10 CAD IN NATIVE ARTERY: ICD-10-CM

## 2019-12-27 PROCEDURE — 3079F DIAST BP 80-89 MM HG: CPT | Performed by: INTERNAL MEDICINE

## 2019-12-27 PROCEDURE — 93000 ELECTROCARDIOGRAM COMPLETE: CPT | Performed by: INTERNAL MEDICINE

## 2019-12-27 PROCEDURE — 3074F SYST BP LT 130 MM HG: CPT | Performed by: INTERNAL MEDICINE

## 2019-12-27 PROCEDURE — 99214 OFFICE O/P EST MOD 30 MIN: CPT | Performed by: INTERNAL MEDICINE

## 2019-12-27 RX ORDER — LOSARTAN POTASSIUM AND HYDROCHLOROTHIAZIDE 12.5; 5 MG/1; MG/1
1 TABLET ORAL DAILY
Qty: 90 TABLET | Refills: 3 | Status: SHIPPED | OUTPATIENT
Start: 2019-12-27

## 2019-12-27 RX ORDER — ROSUVASTATIN CALCIUM 20 MG/1
20 TABLET, COATED ORAL
Qty: 90 TABLET | Refills: 3 | Status: SHIPPED | OUTPATIENT
Start: 2019-12-27 | End: 2020-10-05 | Stop reason: SDUPTHER

## 2019-12-27 RX ORDER — ASPIRIN 81 MG/1
81 TABLET ORAL
Qty: 90 TABLET | Refills: 3 | Status: SHIPPED | OUTPATIENT
Start: 2019-12-27

## 2019-12-27 RX ORDER — ATENOLOL 25 MG/1
25 TABLET ORAL
Qty: 90 TABLET | Refills: 1 | Status: SHIPPED | OUTPATIENT
Start: 2019-12-27 | End: 2020-08-17 | Stop reason: SDUPTHER

## 2019-12-27 RX ORDER — EZETIMIBE 10 MG/1
10 TABLET ORAL DAILY
Qty: 90 TABLET | Refills: 3 | Status: SHIPPED | OUTPATIENT
Start: 2019-12-27 | End: 2020-10-05 | Stop reason: SDUPTHER

## 2019-12-27 NOTE — PROGRESS NOTES
Cardiology Follow Up  Rajesh Mendoza  1965  [id number]  [facility] Viktor Emmanuel  [address] [facility]    1  Benign essential hypertension  POCT ECG    ezetimibe (ZETIA) 10 mg tablet   2  Hyperlipidemia, unspecified hyperlipidemia type  POCT ECG    rosuvastatin (CRESTOR) 20 MG tablet   3  CAD in native artery  atenolol (TENORMIN) 25 mg tablet      Discussion/Plan:  Abnormal Myocardial Perfusion Study- Small reversible defect note in the inferior/septal wall at high work-load on his treadmill nuclear study  Risk factor of age, htn, hld, family hx of premature cad, and gender  Aggressive medical therapy with improved symptoms  Remain asymptomatic   - continue aspirin 81 mg  - rosuvastatin 20mg daiy + zetia 10mg daily + coenzyme q 200mg daily  - atenolol 25mg daily  - diet + exercise    Lipid Testing- His [facility] Heart Panel explained in detail  His liver production markers of cholesterol are optimally controlled  His direct LDL is 811 and LPa is 20  He has borderline small dense LDL but his HDL Apo A-1 particles levels is low at 14 5  This level can be improved with continue exercise, central obesity weight loss, and carb restriction  His absorber markers are controlled as his saturated and trans fat intake is low but his good cholesterol Omega-3 FA is low  He is currently not making arterial plaque as his LpPLA2 levels are low  Genetically he is negative for the [facility] B deficiency and therefore should not be statin intolerant but his vitamin D levels are low and should be replenished  - omega 3 fatty acid supplementation 4000mg daily  - continue vigorous exercise  - zetia  - recheck lipids periodically    Elevated bp- improved  - losartan/hctz 50-12 5mg    Morbid obesity BMI-35- he is going to to start working    Interval History:  Less stress    He is walking a mile without any chest discomfort or shortness of breath  Denies having palpitations irregular heart rhythm  Been compliant medications  Denies feeling dizzy or lightheaded  12/27/2019: He has been exerting himself without significant chest tightness or shortness of breath  Denies having significant palpitations  His blood pressures been controlled  He is taking his cholesterol medication      Patient Active Problem List   Diagnosis    Abnormal stress ECG with treadmill    Allergic rhinitis    Benign essential hypertension    Depression with anxiety    Hyperlipidemia    MTHFR mutation (HCC)    Obesity    PND (post-nasal drip)    Vitamin D deficiency    Routine physical examination    Chronic maxillary sinusitis    Nasal septal deviation    Nasal turbinate hypertrophy     Past Medical History:   Diagnosis Date    Abnormal stress ECG with treadmill     Anxiety     Chest pain     Environmental allergies     GERD (gastroesophageal reflux disease)     Hiatal hernia     History of psychiatric treatment     Hyperlipemia     Hypertension     Lightheadedness     MTHFR mutation (HCC)     Myalgia     Shortness of breath     Stomach problems      Social History     Socioeconomic History    Marital status: /Civil Union     Spouse name: Not on file    Number of children: Not on file    Years of education: Not on file    Highest education level: Not on file   Occupational History    Not on file   Social Needs    Financial resource strain: Not on file    Food insecurity:     Worry: Not on file     Inability: Not on file    Transportation needs:     Medical: Not on file     Non-medical: Not on file   Tobacco Use    Smoking status: Never Smoker    Smokeless tobacco: Never Used   Substance and Sexual Activity    Alcohol use: No    Drug use: No    Sexual activity: Not on file   Lifestyle    Physical activity:     Days per week: Not on file     Minutes per session: Not on file    Stress: Not on file   Relationships  Social connections:     Talks on phone: Not on file     Gets together: Not on file     Attends Alevism service: Not on file     Active member of club or organization: Not on file     Attends meetings of clubs or organizations: Not on file     Relationship status: Not on file    Intimate partner violence:     Fear of current or ex partner: Not on file     Emotionally abused: Not on file     Physically abused: Not on file     Forced sexual activity: Not on file   Other Topics Concern    Not on file   Social History Narrative    Sleeps 6-7 hours a day    Stress at work      Family History   Problem Relation Age of Onset    Coronary artery disease Mother         sx    Cardiomyopathy Mother         congestive    Hyperlipidemia Mother     Hypertension Mother      Past Surgical History:   Procedure Laterality Date    APPENDECTOMY      COLONOSCOPY      OR REPAIR OF NASAL SEPTUM N/A 4/4/2019    Procedure: SEPTOPLASTY, BILATERAL INFERRIOR TURBINATE REDUCTION, RIGHT MAXILLARY ANTROSTOMY;  Surgeon: Marcia Frost MD;  Location: [address] [place] [address];  Service: ENT    VASECTOMY      VEIN LIGATION      varicose       Current Outpatient Medications:     atenolol (TENORMIN) 25 mg tablet, Take 1 tablet (25 mg total) by mouth daily at bedtime, Disp: 90 tablet, Rfl: 1    Cholecalciferol (VITAMIN D) 2000 units CAPS, Take 1 capsule by mouth daily, Disp: , Rfl:     citalopram (CeleXA) 40 mg tablet, Take 1 tablet (40 mg total) by mouth daily at bedtime, Disp: 90 tablet, Rfl: 0    Coenzyme Q10 200 MG capsule, Take 1 capsule by mouth daily, Disp: , Rfl:     ezetimibe (ZETIA) 10 mg tablet, Take 1 tablet (10 mg total) by mouth daily, Disp: 90 tablet, Rfl: 3    Folate-B12-Intrinsic Factor (INTRINSI B12-FOLATE) 800-500-20 MCG-MCG-MG TABS, Take 1 tablet by mouth daily, Disp: , Rfl:     losartan-hydrochlorothiazide (HYZAAR) 50-12 5 mg per tablet, Take 1 tablet by mouth daily, Disp: 90 tablet, Rfl: 3    doxycycline hyclate (VIBRA-TABS) 100 mg tablet, take 1 tablet by mouth twice a day for 10 days, Disp: , Rfl: 0    RABEprazole (ACIPHEX) 20 MG tablet, Take 1 tablet by mouth 2 (two) times a day Note- pt takes mainly prn, Disp: , Rfl:     rosuvastatin (CRESTOR) 20 MG tablet, Take 1 tablet (20 mg total) by mouth daily at bedtime, Disp: 90 tablet, Rfl: 3  Allergies   Allergen Reactions    Amoxicillin Hives     Reaction Date: [id number]; Annotation - [id number]: Elijah Curio Atorvastatin Edema     Reaction Date: 10Sep2004;     Sulfa Antibiotics GI Intolerance     Zithromax z-alyssa - Reaction Date: 18Jul2011; Annotation - [id number]: gastritis  tc/cma    Moxifloxacin Rash     Reaction Date: 03Jun2008; Review of Systems:  Review of Systems   Constitutional: Negative  HENT: Negative  Eyes: Negative  Respiratory: Negative  Cardiovascular: Negative  Gastrointestinal: Negative  Endocrine: Negative  Genitourinary: Negative  Musculoskeletal: Negative  Skin: Negative  Allergic/Immunologic: Negative  Neurological: Negative  Hematological: Negative  Psychiatric/Behavioral: Negative  Vitals:    12/27/19 1006   BP: 120/86   BP Location: Right arm   Patient Position: Sitting   Cuff Size: Large   Pulse: (!) 54   SpO2: 97%   Weight: 123 kg (271 lb 8 oz)   Height: 6' 1" (1 854 m)     Physical Exam:  Physical Exam   Constitutional: He is oriented to person, place, and time  No distress  HENT:   Head: Normocephalic and atraumatic  Right Ear: External ear normal    Left Ear: External ear normal    Eyes: Pupils are equal, round, and reactive to light  Conjunctivae are normal  Right eye exhibits no discharge  Left eye exhibits no discharge  No scleral icterus  Neck: Normal range of motion  Neck supple  No JVD present  No tracheal deviation present  No thyromegaly present  Cardiovascular: Normal rate and regular rhythm  Exam reveals gallop  Exam reveals no friction rub  No murmur heard    Pulmonary/Chest: Effort normal and breath sounds normal  No stridor  No respiratory distress  He has no wheezes  He has no rales  He exhibits no tenderness  Abdominal: Soft  Bowel sounds are normal  He exhibits no distension and no mass  There is no tenderness  There is no rebound and no guarding  Musculoskeletal: Normal range of motion  He exhibits no edema, tenderness or deformity  Neurological: He is alert and oriented to person, place, and time  He has normal reflexes  No cranial nerve deficit  He exhibits normal muscle tone  Coordination normal    Skin: Skin is warm and dry  No rash noted  He is not diaphoretic  No erythema  No pallor  Psychiatric: He has a normal mood and affect  His behavior is normal  Judgment and thought content normal        Labs:     Lab Results   Component Value Date    WBC 8 58 03/29/2019    HGB 15 0 03/29/2019    HCT 45 2 03/29/2019    MCV 89 03/29/2019     03/29/2019     Lab Results   Component Value Date     11/17/2017    K 3 6 03/29/2019     03/29/2019    CO2 26 03/29/2019    BUN 12 03/29/2019    CREATININE 0 93 03/29/2019    GLUCOSE 97 01/22/2018    GLUF 84 03/29/2019    CALCIUM 9 4 03/29/2019    AST 23 12/10/2018    ALT 28 12/10/2018    ALKPHOS 111 11/17/2017    PROT 6 8 11/17/2017    BILITOT 0 9 11/17/2017    EGFR 94 03/29/2019     Lab Results   Component Value Date    CHOL 135 11/17/2017    CHOL 222 (H) 06/03/2016     Lab Results   Component Value Date    HDL 29 (L) 12/10/2018    HDL 31 (L) 11/17/2017    HDL 31 (L) 06/03/2016     Lab Results   Component Value Date    LDLCALC 75 11/17/2017    LDLCALC 149 (H) 06/03/2016     Lab Results   Component Value Date    TRIG 177 (H) 12/10/2018    TRIG 146 11/17/2017    TRIG 210 (H) 06/03/2016     No results found for: HGBA1C    Imaging & Testing   I have personally reviewed pertinent reports  EKG: Personally reviewed      Normal sinus rhythm lvh st-t wave changes anterolateral unchanged from prior ekg  Sinus bradycardia incomplete right bundle branch block nonspecific T-wave changes      Caroline Sullivan  Please call with any questions or suggestions    A description of the counseling:   Goals and Barriers:  Patient's ability to self care:  Medication side effect reviewed with patient in detail and all their questions answered  "This note has been constructed using a voice recognition system  Therefore there may be syntax, spelling, and/or grammatical errors   Please call if you have any questions  "

## 2019-12-30 ENCOUNTER — TELEPHONE (OUTPATIENT)
Dept: CARDIOLOGY CLINIC | Facility: CLINIC | Age: 54
End: 2019-12-30

## 2020-01-20 DIAGNOSIS — F32.A DEPRESSION, UNSPECIFIED DEPRESSION TYPE: ICD-10-CM

## 2020-01-20 RX ORDER — CITALOPRAM 40 MG/1
40 TABLET ORAL
Qty: 90 TABLET | Refills: 0 | Status: SHIPPED | OUTPATIENT
Start: 2020-01-20 | End: 2020-04-13 | Stop reason: SDUPTHER

## 2020-02-12 LAB
ALBUMIN SERPL-MCNC: 4.6 G/DL (ref 3.8–4.9)
ALBUMIN/GLOB SERPL: 2.1 {RATIO} (ref 1.2–2.2)
ALP SERPL-CCNC: 104 IU/L (ref 39–117)
ALT SERPL-CCNC: 43 IU/L (ref 0–44)
AST SERPL-CCNC: 34 IU/L (ref 0–40)
BASOPHILS # BLD AUTO: 0.1 X10E3/UL (ref 0–0.2)
BASOPHILS NFR BLD AUTO: 1 %
BILIRUB SERPL-MCNC: 0.6 MG/DL (ref 0–1.2)
BUN SERPL-MCNC: 13 MG/DL (ref 6–24)
BUN/CREAT SERPL: 12 (ref 9–20)
CALCIUM SERPL-MCNC: 9.5 MG/DL (ref 8.7–10.2)
CHLORIDE SERPL-SCNC: 103 MMOL/L (ref 96–106)
CHOLEST SERPL-MCNC: 150 MG/DL (ref 100–199)
CO2 SERPL-SCNC: 20 MMOL/L (ref 20–29)
CREAT SERPL-MCNC: 1.06 MG/DL (ref 0.76–1.27)
EOSINOPHIL # BLD AUTO: 0.4 X10E3/UL (ref 0–0.4)
EOSINOPHIL NFR BLD AUTO: 4 %
ERYTHROCYTE [DISTWIDTH] IN BLOOD BY AUTOMATED COUNT: 13.4 % (ref 11.6–15.4)
GLOBULIN SER-MCNC: 2.2 G/DL (ref 1.5–4.5)
GLUCOSE SERPL-MCNC: 97 MG/DL (ref 65–99)
HCT VFR BLD AUTO: 43.6 % (ref 37.5–51)
HDLC SERPL-MCNC: 35 MG/DL
HGB BLD-MCNC: 15.4 G/DL (ref 13–17.7)
IMM GRANULOCYTES # BLD: 0 X10E3/UL (ref 0–0.1)
IMM GRANULOCYTES NFR BLD: 0 %
LDLC SERPL CALC-MCNC: 79 MG/DL (ref 0–99)
LYMPHOCYTES # BLD AUTO: 2.4 X10E3/UL (ref 0.7–3.1)
LYMPHOCYTES NFR BLD AUTO: 29 %
MCH RBC QN AUTO: 30.1 PG (ref 26.6–33)
MCHC RBC AUTO-ENTMCNC: 35.3 G/DL (ref 31.5–35.7)
MCV RBC AUTO: 85 FL (ref 79–97)
MONOCYTES # BLD AUTO: 0.8 X10E3/UL (ref 0.1–0.9)
MONOCYTES NFR BLD AUTO: 9 %
NEUTROPHILS # BLD AUTO: 4.8 X10E3/UL (ref 1.4–7)
NEUTROPHILS NFR BLD AUTO: 57 %
PLATELET # BLD AUTO: 301 X10E3/UL (ref 150–450)
POTASSIUM SERPL-SCNC: 4.1 MMOL/L (ref 3.5–5.2)
PROT SERPL-MCNC: 6.8 G/DL (ref 6–8.5)
RBC # BLD AUTO: 5.12 X10E6/UL (ref 4.14–5.8)
SL AMB EGFR AFRICAN AMERICAN: 92 ML/MIN/1.73
SL AMB EGFR NON AFRICAN AMERICAN: 80 ML/MIN/1.73
SODIUM SERPL-SCNC: 142 MMOL/L (ref 134–144)
TRIGL SERPL-MCNC: 180 MG/DL (ref 0–149)
WBC # BLD AUTO: 8.4 X10E3/UL (ref 3.4–10.8)

## 2020-02-17 ENCOUNTER — TELEPHONE (OUTPATIENT)
Dept: CARDIOLOGY CLINIC | Facility: CLINIC | Age: 55
End: 2020-02-17

## 2020-02-17 NOTE — TELEPHONE ENCOUNTER
----- Message from Tarah Mancini MD sent at 2/17/2020 10:54 AM EST -----  Labwork-kidney function and blood counts look good and normal  His cholesterol bad cholesterol LDL is controlled  He needs to work on triglycerides- lower triglycerides/raise hdl

## 2020-04-13 DIAGNOSIS — F32.A DEPRESSION, UNSPECIFIED DEPRESSION TYPE: ICD-10-CM

## 2020-04-13 RX ORDER — CITALOPRAM 40 MG/1
40 TABLET ORAL
Qty: 90 TABLET | Refills: 0 | Status: SHIPPED | OUTPATIENT
Start: 2020-04-13 | End: 2020-07-23 | Stop reason: SDUPTHER

## 2020-05-18 ENCOUNTER — OFFICE VISIT (OUTPATIENT)
Dept: AUDIOLOGY | Facility: CLINIC | Age: 55
End: 2020-05-18

## 2020-05-18 DIAGNOSIS — H90.3 SENSORY HEARING LOSS, BILATERAL: Primary | ICD-10-CM

## 2020-07-20 DIAGNOSIS — F32.A DEPRESSION, UNSPECIFIED DEPRESSION TYPE: ICD-10-CM

## 2020-07-20 RX ORDER — CITALOPRAM 40 MG/1
40 TABLET ORAL
Qty: 90 TABLET | Refills: 0 | Status: CANCELLED | OUTPATIENT
Start: 2020-07-20

## 2020-07-23 DIAGNOSIS — F32.A DEPRESSION, UNSPECIFIED DEPRESSION TYPE: ICD-10-CM

## 2020-07-23 RX ORDER — CITALOPRAM 40 MG/1
40 TABLET ORAL
Qty: 30 TABLET | Refills: 0 | Status: SHIPPED | OUTPATIENT
Start: 2020-07-23 | End: 2020-07-31 | Stop reason: SDUPTHER

## 2020-07-23 NOTE — TELEPHONE ENCOUNTER
Patient needs physical and follow up  Not seen since 8/26/20   Please schedule and let patient know refilled for 30 days

## 2020-07-31 ENCOUNTER — OFFICE VISIT (OUTPATIENT)
Dept: FAMILY MEDICINE CLINIC | Facility: CLINIC | Age: 55
End: 2020-07-31
Payer: COMMERCIAL

## 2020-07-31 VITALS
SYSTOLIC BLOOD PRESSURE: 120 MMHG | TEMPERATURE: 98.1 F | HEIGHT: 73 IN | WEIGHT: 261.2 LBS | HEART RATE: 64 BPM | RESPIRATION RATE: 14 BRPM | DIASTOLIC BLOOD PRESSURE: 88 MMHG | BODY MASS INDEX: 34.62 KG/M2

## 2020-07-31 DIAGNOSIS — E78.5 HYPERLIPIDEMIA, UNSPECIFIED HYPERLIPIDEMIA TYPE: ICD-10-CM

## 2020-07-31 DIAGNOSIS — Z00.00 ANNUAL PHYSICAL EXAM: Primary | ICD-10-CM

## 2020-07-31 DIAGNOSIS — Z12.5 PROSTATE CANCER SCREENING: ICD-10-CM

## 2020-07-31 DIAGNOSIS — E66.9 OBESITY (BMI 30.0-34.9): ICD-10-CM

## 2020-07-31 DIAGNOSIS — Z12.11 COLON CANCER SCREENING: ICD-10-CM

## 2020-07-31 DIAGNOSIS — F32.A DEPRESSION, UNSPECIFIED DEPRESSION TYPE: ICD-10-CM

## 2020-07-31 DIAGNOSIS — I10 BENIGN ESSENTIAL HYPERTENSION: ICD-10-CM

## 2020-07-31 PROCEDURE — 3074F SYST BP LT 130 MM HG: CPT | Performed by: FAMILY MEDICINE

## 2020-07-31 PROCEDURE — 3008F BODY MASS INDEX DOCD: CPT | Performed by: FAMILY MEDICINE

## 2020-07-31 PROCEDURE — 99396 PREV VISIT EST AGE 40-64: CPT | Performed by: FAMILY MEDICINE

## 2020-07-31 PROCEDURE — 3079F DIAST BP 80-89 MM HG: CPT | Performed by: FAMILY MEDICINE

## 2020-07-31 RX ORDER — CITALOPRAM 40 MG/1
40 TABLET ORAL
Qty: 30 TABLET | Refills: 0 | Status: SHIPPED | OUTPATIENT
Start: 2020-07-31 | End: 2020-09-21 | Stop reason: SDUPTHER

## 2020-07-31 NOTE — PROGRESS NOTES
[address] [facility]    NAME: Glory Lemus  AGE: 50 y o  SEX: male  : 1965     DATE: 2020     Assessment and Plan:     Problem List Items Addressed This Visit        Cardiovascular and Mediastinum    Benign essential hypertension    Relevant Orders    Lipid Panel with Direct LDL reflex    Comprehensive metabolic panel       Other    Hyperlipidemia    Relevant Orders    Lipid Panel with Direct LDL reflex    Comprehensive metabolic panel      Other Visit Diagnoses     Annual physical exam    -  Primary    Obesity (BMI 30 0-34  9)        Colon cancer screening        Relevant Orders    Ambulatory referral to Gastroenterology    Depression, unspecified depression type        Relevant Medications    citalopram (CeleXA) 40 mg tablet    Prostate cancer screening        Relevant Orders    PSA, Total Screen          Immunizations and preventive care screenings were discussed with patient today  Appropriate education was printed on patient's after visit summary  Counseling:  Alcohol/drug use: discussed moderation in alcohol intake, the recommendations for healthy alcohol use, and avoidance of illicit drug use  Dental Health: discussed importance of regular tooth brushing, flossing, and dental visits  Injury prevention: discussed safety/seat belts, safety helmets, smoke detectors, carbon dioxide detectors, and smoking near bedding or upholstery  Sexual health: discussed sexually transmitted diseases, partner selection, use of condoms, avoidance of unintended pregnancy, and contraceptive alternatives  · Exercise: the importance of regular exercise/physical activity was discussed  Recommend exercise 3-5 times per week for at least 30 minutes  · Advised eye exam given decrease in vision  · Colon cancer screening: no insurance  Patient given information for [employer] seed program to get colonoscopy  · HTN: bp stable today     · HLD: currently on crestor,diet modification  BMI Counseling: Body mass index is 34 46 kg/m²  The BMI is above normal  Nutrition recommendations include decreasing portion sizes, encouraging healthy choices of fruits and vegetables, limiting drinks that contain sugar, reducing intake of saturated and trans fat and reducing intake of cholesterol  Exercise recommendations include exercising 3-5 times per week and strength training exercises  Return in about 3 months (around 10/31/2020)  Chief Complaint:     Chief Complaint   Patient presents with    Physical Exam     pt has some anxiety from 5 job losses       History of Present Illness:     Adult Annual Physical   Patient here for a comprehensive physical exam  The patient reports problems - see below  Lost his job and now has no insurance     Needs celexa refilled    PHQ-9 Depression Screening    PHQ-9:    Frequency of the following problems over the past two weeks:       Little interest or pleasure in doing things:  1 - several days  Feeling down, depressed, or hopeless:  1 - several days  Trouble falling or staying asleep, or sleeping too much:  1 - several days  Feeling tired or having little energy:  1 - several days  Poor appetite or overeatin - not at all  Feeling bad about yourself - or that you are a failure or have let yourself or your family down:  0 - not at all  Trouble concentrating on things, such as reading the newspaper or watching television:  0 - not at all  Moving or speaking so slowly that other people could have noticed   Or the opposite - being so fidgety or restless that you have been moving around a lot more than usual:  0 - not at all  Thoughts that you would be better off dead, or of hurting yourself in some way:  0 - not at all  PHQ-2 Score:  2  PHQ-9 Score:  4     Some questions are due to recent job lost      Diet and Physical Activity  · Diet/Nutrition: well balanced diet, consuming 3-5 servings of fruits/vegetables daily and adequate whole grain intake  · Exercise: no formal exercise  Depression Screening  PHQ-9 Depression Screening    PHQ-9:    Frequency of the following problems over the past two weeks:       Little interest or pleasure in doing things:  1 - several days  Feeling down, depressed, or hopeless:  1 - several days  Trouble falling or staying asleep, or sleeping too much:  1 - several days  Feeling tired or having little energy:  1 - several days  Poor appetite or overeatin - not at all  Feeling bad about yourself - or that you are a failure or have let yourself or your family down:  0 - not at all  Trouble concentrating on things, such as reading the newspaper or watching television:  0 - not at all  Moving or speaking so slowly that other people could have noticed  Or the opposite - being so fidgety or restless that you have been moving around a lot more than usual:  0 - not at all  Thoughts that you would be better off dead, or of hurting yourself in some way:  0 - not at all  PHQ-2 Score:  2  PHQ-9 Score:  4       General Health  · Sleep: sleeps well, gets 7-8 hours of sleep on average and snores loudly  · Hearing: requires use of hearing aids  · Vision: vision exam abnormal in office, no recent eye exam,   · Dental: regular dental visits and brushes teeth twice daily   Health  · Symptoms include: none     Review of Systems:     Review of Systems   Constitutional: Negative for activity change and fever  HENT: Negative for congestion and sore throat  Eyes: Negative for visual disturbance  Respiratory: Negative for cough and shortness of breath  Cardiovascular: Negative for chest pain and leg swelling  Gastrointestinal: Negative for abdominal pain, constipation, nausea and vomiting  Genitourinary: Negative for discharge and dysuria  Musculoskeletal: Negative for back pain  Skin: Negative for rash     Neurological: Negative for dizziness, weakness, light-headedness and headaches  Psychiatric/Behavioral: Negative for agitation        Past Medical History:     Past Medical History:   Diagnosis Date    Abnormal stress ECG with treadmill     Anxiety     Chest pain     Environmental allergies     GERD (gastroesophageal reflux disease)     Hiatal hernia     History of psychiatric treatment     Hyperlipemia     Hypertension     Lightheadedness     MTHFR mutation (HCC)     Myalgia     Perceptive hearing loss, both sides     pt has hearing aides    Shortness of breath     Stomach problems       Past Surgical History:     Past Surgical History:   Procedure Laterality Date    APPENDECTOMY      COLONOSCOPY      MS REPAIR OF NASAL SEPTUM N/A 4/4/2019    Procedure: SEPTOPLASTY, BILATERAL INFERRIOR TURBINATE REDUCTION, RIGHT MAXILLARY ANTROSTOMY;  Surgeon: Frederick Anand MD;  Location: [facility];  Service: ENT    VASECTOMY      VEIN LIGATION      varicose      Family History:     Family History   Problem Relation Age of Onset    Coronary artery disease Mother         sx    Cardiomyopathy Mother         congestive    Hyperlipidemia Mother     Hypertension Mother       Social History:     E-Cigarette/Vaping    E-Cigarette Use Never User      E-Cigarette/Vaping Substances    Nicotine No     THC No     CBD No     Flavoring No     Other No     Unknown No      Social History     Socioeconomic History    Marital status: /Civil Union     Spouse name: None    Number of children: None    Years of education: None    Highest education level: None   Occupational History    None   Social Needs    Financial resource strain: None    Food insecurity:     Worry: None     Inability: None    Transportation needs:     Medical: None     Non-medical: None   Tobacco Use    Smoking status: Never Smoker    Smokeless tobacco: Never Used   Substance and Sexual Activity    Alcohol use: No    Drug use: No    Sexual activity: None   Lifestyle    Physical activity: Days per week: None     Minutes per session: None    Stress: None   Relationships    Social connections:     Talks on phone: None     Gets together: None     Attends Baptism service: None     Active member of club or organization: None     Attends meetings of clubs or organizations: None     Relationship status: None    Intimate partner violence:     Fear of current or ex partner: None     Emotionally abused: None     Physically abused: None     Forced sexual activity: None   Other Topics Concern    None   Social History Narrative    Sleeps 6-7 hours a day    Stress at work      Current Medications:     Current Outpatient Medications   Medication Sig Dispense Refill    aspirin (ECOTRIN LOW STRENGTH) 81 mg EC tablet Take 1 tablet (81 mg total) by mouth daily with breakfast 90 tablet 3    atenolol (TENORMIN) 25 mg tablet Take 1 tablet (25 mg total) by mouth daily at bedtime 90 tablet 1    Cholecalciferol (VITAMIN D) 2000 units CAPS Take 1 capsule by mouth daily      citalopram (CeleXA) 40 mg tablet Take 1 tablet (40 mg total) by mouth daily at bedtime 30 tablet 0    Coenzyme Q10 200 MG capsule Take 1 capsule by mouth daily      ezetimibe (ZETIA) 10 mg tablet Take 1 tablet (10 mg total) by mouth daily 90 tablet 3    Folate-B12-Intrinsic Factor (INTRINSI B12-FOLATE) 800-500-20 MCG-MCG-MG TABS Take 1 tablet by mouth daily      losartan-hydrochlorothiazide (HYZAAR) 50-12 5 mg per tablet Take 1 tablet by mouth daily 90 tablet 3    rosuvastatin (CRESTOR) 20 MG tablet Take 1 tablet (20 mg total) by mouth daily at bedtime 90 tablet 3     No current facility-administered medications for this visit  Allergies: Allergies   Allergen Reactions    Amoxicillin Hives     Reaction Date: [id number]; Annotation - [id number]: Lul Joya Atorvastatin Edema     Reaction Date: 10Sep2004;     Sulfa Antibiotics GI Intolerance     Zithromax z-alyssa - Reaction Date: 18Jul2011; Annotation - [id number]: gastritis  tc/cma    Moxifloxacin Rash     Reaction Date: 03Jun2008; Physical Exam:     /88 (BP Location: Left arm, Patient Position: Sitting, Cuff Size: Large)   Pulse 64   Temp 98 1 °F (36 7 °C)   Resp 14   Ht 6' 1" (1 854 m)   Wt 118 kg (261 lb 3 2 oz)   BMI 34 46 kg/m²     Physical Exam   Constitutional: He is oriented to person, place, and time  He appears well-developed and well-nourished  HENT:   Head: Normocephalic and atraumatic  Right Ear: External ear normal    Left Ear: External ear normal    Nose: Nose normal    Mouth/Throat: Oropharynx is clear and moist  No oropharyngeal exudate  Eyes: Conjunctivae and EOM are normal  Right eye exhibits no discharge  Left eye exhibits no discharge  Neck: Normal range of motion  Neck supple  Cardiovascular: Normal rate, regular rhythm, normal heart sounds and intact distal pulses  Pulmonary/Chest: Effort normal and breath sounds normal  He has no wheezes  Abdominal: Soft  Bowel sounds are normal  There is no tenderness  Genitourinary: Prostate normal    Musculoskeletal: He exhibits no edema or tenderness  Lymphadenopathy:     He has no cervical adenopathy  Neurological: He is alert and oriented to person, place, and time  Skin: Skin is warm  No erythema  Psychiatric: He has a normal mood and affect  His behavior is normal    Vitals reviewed        Jackie Angel MD  [address]

## 2020-07-31 NOTE — PATIENT INSTRUCTIONS

## 2020-08-17 DIAGNOSIS — I25.10 CAD IN NATIVE ARTERY: ICD-10-CM

## 2020-08-17 RX ORDER — ATENOLOL 25 MG/1
25 TABLET ORAL
Qty: 90 TABLET | Refills: 3 | Status: SHIPPED | OUTPATIENT
Start: 2020-08-17 | End: 2021-09-07 | Stop reason: SDUPTHER

## 2020-09-21 DIAGNOSIS — F32.A DEPRESSION, UNSPECIFIED DEPRESSION TYPE: ICD-10-CM

## 2020-09-21 RX ORDER — CITALOPRAM 40 MG/1
40 TABLET ORAL
Qty: 30 TABLET | Refills: 0 | Status: SHIPPED | OUTPATIENT
Start: 2020-09-21 | End: 2020-10-05 | Stop reason: SDUPTHER

## 2020-10-05 DIAGNOSIS — E78.5 HYPERLIPIDEMIA, UNSPECIFIED HYPERLIPIDEMIA TYPE: ICD-10-CM

## 2020-10-05 DIAGNOSIS — F32.A DEPRESSION, UNSPECIFIED DEPRESSION TYPE: ICD-10-CM

## 2020-10-05 DIAGNOSIS — I10 BENIGN ESSENTIAL HYPERTENSION: ICD-10-CM

## 2020-10-05 RX ORDER — EZETIMIBE 10 MG/1
10 TABLET ORAL DAILY
Qty: 90 TABLET | Refills: 3 | Status: SHIPPED | OUTPATIENT
Start: 2020-10-05

## 2020-10-05 RX ORDER — CITALOPRAM 40 MG/1
40 TABLET ORAL
Qty: 90 TABLET | Refills: 0 | Status: SHIPPED | OUTPATIENT
Start: 2020-10-05 | End: 2021-01-19 | Stop reason: SDUPTHER

## 2020-10-05 RX ORDER — ROSUVASTATIN CALCIUM 20 MG/1
20 TABLET, COATED ORAL
Qty: 90 TABLET | Refills: 3 | Status: SHIPPED | OUTPATIENT
Start: 2020-10-05

## 2020-10-10 ENCOUNTER — TELEPHONE (OUTPATIENT)
Dept: GASTROENTEROLOGY | Facility: CLINIC | Age: 55
End: 2020-10-10

## 2020-11-03 ENCOUNTER — OFFICE VISIT (OUTPATIENT)
Dept: AUDIOLOGY | Facility: CLINIC | Age: 55
End: 2020-11-03

## 2020-11-03 DIAGNOSIS — H90.3 SENSORY HEARING LOSS, BILATERAL: Primary | ICD-10-CM

## 2020-11-06 ENCOUNTER — VBI (OUTPATIENT)
Dept: ADMINISTRATIVE | Facility: OTHER | Age: 55
End: 2020-11-06

## 2020-12-14 ENCOUNTER — VBI (OUTPATIENT)
Dept: ADMINISTRATIVE | Facility: OTHER | Age: 55
End: 2020-12-14

## 2021-01-19 DIAGNOSIS — F32.A DEPRESSION, UNSPECIFIED DEPRESSION TYPE: ICD-10-CM

## 2021-01-19 RX ORDER — CITALOPRAM 40 MG/1
40 TABLET ORAL
Qty: 90 TABLET | Refills: 0 | Status: SHIPPED | OUTPATIENT
Start: 2021-01-19 | End: 2021-04-26 | Stop reason: SDUPTHER

## 2021-04-26 DIAGNOSIS — F32.A DEPRESSION, UNSPECIFIED DEPRESSION TYPE: ICD-10-CM

## 2021-04-26 RX ORDER — CITALOPRAM 40 MG/1
40 TABLET ORAL
Qty: 30 TABLET | Refills: 0 | Status: SHIPPED | OUTPATIENT
Start: 2021-04-26 | End: 2021-06-09 | Stop reason: SDUPTHER

## 2021-04-27 ENCOUNTER — OFFICE VISIT (OUTPATIENT)
Dept: FAMILY MEDICINE CLINIC | Facility: CLINIC | Age: 56
End: 2021-04-27
Payer: COMMERCIAL

## 2021-04-27 VITALS
WEIGHT: 262.4 LBS | BODY MASS INDEX: 34.78 KG/M2 | DIASTOLIC BLOOD PRESSURE: 70 MMHG | OXYGEN SATURATION: 99 % | HEIGHT: 73 IN | TEMPERATURE: 97.7 F | RESPIRATION RATE: 14 BRPM | SYSTOLIC BLOOD PRESSURE: 110 MMHG | HEART RATE: 99 BPM

## 2021-04-27 DIAGNOSIS — E78.5 HYPERLIPIDEMIA, UNSPECIFIED HYPERLIPIDEMIA TYPE: ICD-10-CM

## 2021-04-27 DIAGNOSIS — E66.09 CLASS 1 OBESITY DUE TO EXCESS CALORIES WITH SERIOUS COMORBIDITY AND BODY MASS INDEX (BMI) OF 34.0 TO 34.9 IN ADULT: ICD-10-CM

## 2021-04-27 DIAGNOSIS — I10 BENIGN ESSENTIAL HYPERTENSION: Primary | ICD-10-CM

## 2021-04-27 DIAGNOSIS — Z12.11 COLON CANCER SCREENING: ICD-10-CM

## 2021-04-27 DIAGNOSIS — Z12.5 PROSTATE CANCER SCREENING: ICD-10-CM

## 2021-04-27 DIAGNOSIS — F41.8 DEPRESSION WITH ANXIETY: ICD-10-CM

## 2021-04-27 DIAGNOSIS — E55.9 VITAMIN D DEFICIENCY: ICD-10-CM

## 2021-04-27 PROCEDURE — 99214 OFFICE O/P EST MOD 30 MIN: CPT | Performed by: FAMILY MEDICINE

## 2021-04-27 PROCEDURE — 3725F SCREEN DEPRESSION PERFORMED: CPT | Performed by: FAMILY MEDICINE

## 2021-04-27 PROCEDURE — 36415 COLL VENOUS BLD VENIPUNCTURE: CPT | Performed by: FAMILY MEDICINE

## 2021-04-27 PROCEDURE — 3078F DIAST BP <80 MM HG: CPT | Performed by: FAMILY MEDICINE

## 2021-04-27 PROCEDURE — 3074F SYST BP LT 130 MM HG: CPT | Performed by: FAMILY MEDICINE

## 2021-04-27 PROCEDURE — 1036F TOBACCO NON-USER: CPT | Performed by: FAMILY MEDICINE

## 2021-04-28 LAB
25(OH)D3+25(OH)D2 SERPL-MCNC: 25.1 NG/ML (ref 30–100)
ALBUMIN SERPL-MCNC: 4.5 G/DL (ref 3.8–4.9)
ALBUMIN/GLOB SERPL: 1.9 {RATIO} (ref 1.2–2.2)
ALP SERPL-CCNC: 103 IU/L (ref 39–117)
ALT SERPL-CCNC: 38 IU/L (ref 0–44)
AST SERPL-CCNC: 32 IU/L (ref 0–40)
BILIRUB SERPL-MCNC: 1 MG/DL (ref 0–1.2)
BUN SERPL-MCNC: 13 MG/DL (ref 6–24)
BUN/CREAT SERPL: 13 (ref 9–20)
CALCIUM SERPL-MCNC: 9.1 MG/DL (ref 8.7–10.2)
CHLORIDE SERPL-SCNC: 105 MMOL/L (ref 96–106)
CHOLEST SERPL-MCNC: 183 MG/DL (ref 100–199)
CO2 SERPL-SCNC: 21 MMOL/L (ref 20–29)
CREAT SERPL-MCNC: 1.01 MG/DL (ref 0.76–1.27)
GLOBULIN SER-MCNC: 2.4 G/DL (ref 1.5–4.5)
GLUCOSE SERPL-MCNC: 101 MG/DL (ref 65–99)
HBA1C MFR BLD: 5.5 % (ref 4.8–5.6)
HDLC SERPL-MCNC: 34 MG/DL
LDLC SERPL CALC-MCNC: 117 MG/DL (ref 0–99)
MICRODELETION SYND BLD/T FISH: NORMAL
POTASSIUM SERPL-SCNC: 3.7 MMOL/L (ref 3.5–5.2)
PROT SERPL-MCNC: 6.9 G/DL (ref 6–8.5)
PSA SERPL-MCNC: 0.6 NG/ML (ref 0–4)
SL AMB EGFR AFRICAN AMERICAN: 97 ML/MIN/1.73
SL AMB EGFR NON AFRICAN AMERICAN: 84 ML/MIN/1.73
SL AMB VLDL CHOLESTEROL CALC: 32 MG/DL (ref 5–40)
SODIUM SERPL-SCNC: 141 MMOL/L (ref 134–144)
TRIGL SERPL-MCNC: 178 MG/DL (ref 0–149)

## 2021-04-28 NOTE — PROGRESS NOTES
Assessment/Plan:    1  Benign essential hypertension  Assessment & Plan: Bp stable  Continue with medication  Diet adjustment and weight loss  Orders:  -     Comprehensive metabolic panel; Future  -     Lipid Panel with Direct LDL reflex; Future  -     Hemoglobin A1C; Future    2  Hyperlipidemia, unspecified hyperlipidemia type  Assessment & Plan:  Continue with medication  Bloodwork done in office today  Orders:  -     Comprehensive metabolic panel; Future  -     Lipid Panel with Direct LDL reflex; Future  -     Hemoglobin A1C; Future    3  Depression with anxiety  Assessment & Plan:  Patient has no active thoughts of harming self or anyone  Would like to continue current dose  4  Class 1 obesity due to excess calories with serious comorbidity and body mass index (BMI) of 34 0 to 34 9 in adult  -     Hemoglobin A1C; Future    5  Vitamin D deficiency  Assessment & Plan:  History of vitamin d def  Would like to recheck it     Orders:  -     Vitamin D 25 hydroxy; Future    6  Colon cancer screening  -     Ambulatory referral to Gastroenterology; Future    7  Prostate cancer screening  -     PSA, Total Screen; Future        Stressed importance of colon cancer screening  Return if symptoms worsen or fail to improve  Subjective:      Patient ID: Hakeem Orellana is a 49 y o  male  Chief Complaint   Patient presents with    Follow-up     medications       HPI  50 y/o male presents for follow up  Patient states taking medications daily  Doing okay on celexa  He has personal family issues with brother in law who is sick living with him but he can handle it  Patient has cholesterol and takes crestor and zetia  Not well balanced diet  Patient bp is stable  Denies any chest pain or shortness of breath  Colonoscopy was ordered prior but patient never went and needs a new order       PHQ-9 Depression Screening    PHQ-9:   Frequency of the following problems over the past two weeks:      Little interest or pleasure in doing things: 1 - several days  Feeling down, depressed, or hopeless: 1 - several days  Trouble falling or staying asleep, or sleeping too much: 1 - several days  Feeling tired or having little energy: 1 - several days  Poor appetite or overeatin - more than half the days  Feeling bad about yourself - or that you are a failure or have let yourself or your family down: 1 - several days  Trouble concentrating on things, such as reading the newspaper or watching television: 1 - several days  Moving or speaking so slowly that other people could have noticed  Or the opposite - being so fidgety or restless that you have been moving around a lot more than usual: 0 - not at all  Thoughts that you would be better off dead, or of hurting yourself in some way: 0 - not at all  PHQ-2 Score: 2  PHQ-9 Score: 8       The following portions of the patient's history were reviewed and updated as appropriate: allergies, current medications, past family history, past medical history, past social history, past surgical history and problem list     Review of Systems   Constitutional: Negative for fever  HENT: Negative for congestion and sore throat  Eyes: Negative for visual disturbance  Respiratory: Negative for cough and shortness of breath  Cardiovascular: Negative for chest pain and leg swelling  Gastrointestinal: Negative for abdominal pain, constipation, nausea and vomiting  Genitourinary: Negative for dysuria  Musculoskeletal: Negative for back pain  Skin: Negative for rash  Neurological: Negative for dizziness, weakness, light-headedness and headaches  Psychiatric/Behavioral: Negative for agitation           Current Outpatient Medications   Medication Sig Dispense Refill    aspirin (ECOTRIN LOW STRENGTH) 81 mg EC tablet Take 1 tablet (81 mg total) by mouth daily with breakfast 90 tablet 3    atenolol (TENORMIN) 25 mg tablet Take 1 tablet (25 mg total) by mouth daily at bedtime 90 tablet 3  Cholecalciferol (VITAMIN D) 2000 units CAPS Take 1 capsule by mouth daily      citalopram (CeleXA) 40 mg tablet Take 1 tablet (40 mg total) by mouth daily at bedtime 30 tablet 0    Coenzyme Q10 200 MG capsule Take 1 capsule by mouth daily      ezetimibe (ZETIA) 10 mg tablet Take 1 tablet (10 mg total) by mouth daily 90 tablet 3    Folate-B12-Intrinsic Factor (INTRINSI X73-FWSCBD) 588-860-75 MCG-MCG-MG TABS Take 1 tablet by mouth daily      losartan-hydrochlorothiazide (HYZAAR) 50-12 5 mg per tablet Take 1 tablet by mouth daily 90 tablet 3    rosuvastatin (CRESTOR) 20 MG tablet Take 1 tablet (20 mg total) by mouth daily at bedtime 90 tablet 3     No current facility-administered medications for this visit  Objective:    /70 (BP Location: Left arm, Patient Position: Sitting, Cuff Size: Standard)   Pulse 99   Temp 97 7 °F (36 5 °C) (Temporal)   Resp 14   Ht 6' 1" (1 854 m)   Wt 119 kg (262 lb 6 4 oz)   SpO2 99%   BMI 34 62 kg/m²        Physical Exam  Constitutional:       Appearance: He is well-developed  He is obese  HENT:      Head: Normocephalic and atraumatic  Right Ear: Tympanic membrane and external ear normal       Left Ear: Tympanic membrane and external ear normal       Nose: Nose normal       Mouth/Throat:      Pharynx: Oropharynx is clear  No oropharyngeal exudate  Eyes:      General:         Right eye: No discharge  Left eye: No discharge  Conjunctiva/sclera: Conjunctivae normal    Neck:      Musculoskeletal: Neck supple  Cardiovascular:      Rate and Rhythm: Normal rate and regular rhythm  Heart sounds: Normal heart sounds  No murmur  Pulmonary:      Effort: Pulmonary effort is normal  No respiratory distress  Breath sounds: Normal breath sounds  No wheezing  Abdominal:      General: Bowel sounds are normal  There is no distension  Palpations: Abdomen is soft  Tenderness: There is no abdominal tenderness     Musculoskeletal: Normal range of motion  General: No deformity  Lymphadenopathy:      Cervical: No cervical adenopathy  Skin:     General: Skin is warm  Findings: No rash  Neurological:      Mental Status: He is alert and oriented to person, place, and time  Mental status is at baseline     Psychiatric:         Mood and Affect: Mood normal          Behavior: Behavior normal                 Rehana Larson MD

## 2021-06-08 ENCOUNTER — OFFICE VISIT (OUTPATIENT)
Dept: AUDIOLOGY | Facility: CLINIC | Age: 56
End: 2021-06-08

## 2021-06-08 DIAGNOSIS — H90.3 SENSORY HEARING LOSS, BILATERAL: Primary | ICD-10-CM

## 2021-06-08 NOTE — PROGRESS NOTES
Hearing Aid Visit:    Name:  Simran Ye  :  1965  Age:  49 y o  Date of Evaluation: 21     Simran Ye is being seen for a 6 month hearing aid visit  Patient is fit with Ot[employer] OPN S 2 minirite R hearing aid(s)  Right serial number [id number]  Left serial number [id number]  Warranty date: 22 (Loss/Damage and repair)  Patient reports no current issues with the devices  Action:  1  His wax guards, domes and canal locks were all in good working order  2  Listening check revealed good output w/o issue  3  His canals are clear    Recommendations:   1  Scheduled for a 2 year audio (w/HA check) on 21         Beverley Parmar , CCC-A, [place]# [id number], Hearing Aid Dispenser, [place]# [id number]  Clinical Audiologist

## 2021-06-09 DIAGNOSIS — F32.A DEPRESSION, UNSPECIFIED DEPRESSION TYPE: ICD-10-CM

## 2021-06-09 RX ORDER — CITALOPRAM 40 MG/1
40 TABLET ORAL
Qty: 90 TABLET | Refills: 0 | Status: SHIPPED | OUTPATIENT
Start: 2021-06-09 | End: 2021-09-07 | Stop reason: SDUPTHER

## 2021-07-15 ENCOUNTER — OFFICE VISIT (OUTPATIENT)
Dept: GASTROENTEROLOGY | Facility: CLINIC | Age: 56
End: 2021-07-15
Payer: COMMERCIAL

## 2021-07-15 ENCOUNTER — OFFICE VISIT (OUTPATIENT)
Dept: AUDIOLOGY | Facility: CLINIC | Age: 56
End: 2021-07-15

## 2021-07-15 VITALS
WEIGHT: 261 LBS | HEIGHT: 73 IN | HEART RATE: 55 BPM | BODY MASS INDEX: 34.59 KG/M2 | DIASTOLIC BLOOD PRESSURE: 80 MMHG | SYSTOLIC BLOOD PRESSURE: 126 MMHG

## 2021-07-15 DIAGNOSIS — Z12.11 COLON CANCER SCREENING: ICD-10-CM

## 2021-07-15 DIAGNOSIS — H90.3 SENSORY HEARING LOSS, BILATERAL: Primary | ICD-10-CM

## 2021-07-15 PROCEDURE — 99243 OFF/OP CNSLTJ NEW/EST LOW 30: CPT | Performed by: INTERNAL MEDICINE

## 2021-07-15 NOTE — PROGRESS NOTES
[facility] - Outpatient Consultation  Simba Daley 49 y o  male MRN: [id number]  Encounter: [id number]          ASSESSMENT AND PLAN:      1  Colon cancer screening   no significant upper or lower GI symptoms  No family history colon cancer  Wishes to schedule his for screening colonoscopy  We went through the logistics addresses questions and got him scheduled  We will keep you posted of her progress  Further follow-up will depend on findings during time of colonoscopy  - Ambulatory referral to Gastroenterology  - Colonoscopy; Future    ______________________________________________________________________    HPI:    Very pleasant 72-year-old gentleman presents to discuss schedules for screening colonoscopy  Speaking him he had EGD and colonoscopy years ago was diagnosed with SIBO was treated in doing quite well  There is no family history of celiac disease IBD or cancers of the GI tract  REVIEW OF SYSTEMS:    CONSTITUTIONAL: Denies any fever, chills, rigors, and weight loss  HEENT: No earache or tinnitus  Denies hearing loss or visual disturbances  CARDIOVASCULAR: No chest pain or palpitations  RESPIRATORY: Denies any cough, hemoptysis, shortness of breath or dyspnea on exertion  GASTROINTESTINAL: As noted in the History of Present Illness  GENITOURINARY: No problems with urination  Denies any hematuria or dysuria  NEUROLOGIC: No dizziness or vertigo, denies headaches  MUSCULOSKELETAL: Denies any muscle or joint pain  SKIN: Denies skin rashes or itching  ENDOCRINE: Denies excessive thirst  Denies intolerance to heat or cold  PSYCHOSOCIAL: Denies depression or anxiety  Denies any recent memory loss         Historical Information   Past Medical History:   Diagnosis Date    Abnormal stress ECG with treadmill     Anxiety     Chest pain     Environmental allergies     GERD (gastroesophageal reflux disease)     Hiatal hernia     History of psychiatric treatment     Hyperlipemia     Hypertension     Lightheadedness     MTHFR mutation     Myalgia     Perceptive hearing loss, both sides     pt has hearing aides    Shortness of breath     Stomach problems      Past Surgical History:   Procedure Laterality Date    APPENDECTOMY      COLONOSCOPY      [facility] REPAIR OF NASAL SEPTUM N/A 4/4/2019    Procedure: SEPTOPLASTY, BILATERAL INFERRIOR TURBINATE REDUCTION, RIGHT MAXILLARY ANTROSTOMY;  Surgeon: Kimberly Tesfaye MD;  Location: [facility];  Service: ENT    [address] [place] [address],[address]      varicose     Social History   Social History     Substance and Sexual Activity   Alcohol Use No     Social History     Substance and Sexual Activity   Drug Use No     Social History     Tobacco Use   Smoking Status Never Smoker   Smokeless Tobacco Never Used     Family History   Problem Relation Age of Onset    Coronary artery disease Mother         sx    Cardiomyopathy Mother         congestive    Hyperlipidemia Mother     Hypertension Mother        Meds/Allergies       Current Outpatient Medications:     aspirin (ECOTRIN LOW STRENGTH) 81 mg EC tablet    atenolol (TENORMIN) 25 mg tablet    Cholecalciferol (VITAMIN D) 2000 units CAPS    citalopram (CeleXA) 40 mg tablet    Coenzyme Q10 200 MG capsule    ezetimibe (ZETIA) 10 mg tablet    Folate-B12-Intrinsic Factor (INTRINSI B12-FOLATE) 800-500-20 MCG-MCG-MG TABS    losartan-hydrochlorothiazide (HYZAAR) 50-12 5 mg per tablet    rosuvastatin (CRESTOR) 20 MG tablet    Allergies   Allergen Reactions    Amoxicillin Hives     Reaction Date: [id number]; Annotation - [id number]: Doll Moots Atorvastatin Edema     Reaction Date: 10Sep2004;     Sulfa Antibiotics GI Intolerance     Zithromax z-alyssa - Reaction Date: 18Jul2011; Annotation - [id number]: gastritis  tc/cma    Moxifloxacin Rash     Reaction Date: 03Jun2008; Objective     Blood pressure 126/80, pulse 55, height 6' 1" (1 854 m), weight 118 kg (261 lb)   Body mass index is 34 43 kg/m²  PHYSICAL EXAM:      General Appearance:   Alert, cooperative, no distress   HEENT:   Normocephalic, atraumatic, anicteric      Neck:  Supple, symmetrical, trachea midline   Lungs:   Clear to auscultation bilaterally; no rales, rhonchi or wheezing; respirations unlabored    Heart[de-identified]   Regular rate and rhythm; no murmur, rub, or gallop  Abdomen:   Soft, non-tender, non-distended; normal bowel sounds; no masses, no organomegaly    Genitalia:   Deferred    Rectal:   Deferred    Extremities:  No cyanosis, clubbing or edema    Pulses:  2+ and symmetric    Skin:  No jaundice, rashes, or lesions    Lymph nodes:  No palpable cervical lymphadenopathy        Lab Results:   No visits with results within 1 Day(s) from this visit  Latest known visit with results is:   Orders Only on 04/27/2021   Component Date Value    Glucose, Random 04/27/2021 101*    BUN 04/27/2021 13     Creatinine 04/27/2021 1 01     eGFR Non  04/27/2021 84     eGFR  04/27/2021 97     SL AMB BUN/CREATININE RA* 04/27/2021 13     Sodium 04/27/2021 141     Potassium 04/27/2021 3 7     Chloride 04/27/2021 105     CO2 04/27/2021 21     CALCIUM 04/27/2021 9 1     Protein, Total 04/27/2021 6 9     Albumin 04/27/2021 4 5     Globulin, Total 04/27/2021 2 4     Albumin/Globulin Ratio 04/27/2021 1 9     TOTAL BILIRUBIN 04/27/2021 1 0     Alk Phos Isoenzymes 04/27/2021 103     AST 04/27/2021 32     ALT 04/27/2021 38     Cholesterol, Total 04/27/2021 183     Triglycerides 04/27/2021 178*    HDL 04/27/2021 34*    VLDL Cholesterol Calcula* 04/27/2021 32     LDL Calculated 04/27/2021 117*    Prostate Specific Antige* 04/27/2021 0 6     Hemoglobin A1C 04/27/2021 5 5     25-HYDROXY VIT D 04/27/2021 25 1*    Interpretation 04/27/2021 Note          Radiology Results:   No results found

## 2021-07-15 NOTE — PROGRESS NOTES
Progress Note    Name:  Arnold Nair  :  1965  Age:  49 y o  Date of Evaluation: 07/15/21     Patient walked in with non-working left hearing aid  The wire was cutting out when it was moved around  Changed under warranty  Aid then worked fine        Beverley Pelletier , CCC-A, [place]# [id number], Hearing Aid Dispenser, [place]# [id number]  Clinical Audiologist

## 2021-08-30 ENCOUNTER — TELEPHONE (OUTPATIENT)
Dept: PREADMISSION TESTING | Facility: HOSPITAL | Age: 56
End: 2021-08-30

## 2021-08-30 NOTE — PRE-PROCEDURE INSTRUCTIONS
Pre-Surgery Instructions:   Medication Instructions    aspirin (ECOTRIN LOW STRENGTH) 81 mg EC tablet Patient was instructed by Physician and understands   atenolol (TENORMIN) 25 mg tablet Instructed patient per Anesthesia Guidelines   Cholecalciferol (VITAMIN D) 2000 units CAPS Patient was instructed by Physician and understands   citalopram (CeleXA) 40 mg tablet Patient was instructed by Physician and understands   Coenzyme Q10 200 MG capsule Patient was instructed by Physician and understands   ezetimibe (ZETIA) 10 mg tablet Patient was instructed by Physician and understands   Folate-B12-Intrinsic Factor (INTRINSI E67-VDIQFC) 706-551-40 MCG-MCG-MG TABS Patient was instructed by Physician and understands   losartan-hydrochlorothiazide (HYZAAR) 50-12 5 mg per tablet Patient was instructed by Physician and understands   rosuvastatin (CRESTOR) 20 MG tablet Patient was instructed by Physician and understands

## 2021-09-07 ENCOUNTER — TELEPHONE (OUTPATIENT)
Dept: OTHER | Facility: OTHER | Age: 56
End: 2021-09-07

## 2021-09-07 DIAGNOSIS — F32.A DEPRESSION, UNSPECIFIED DEPRESSION TYPE: ICD-10-CM

## 2021-09-07 DIAGNOSIS — I25.10 CAD IN NATIVE ARTERY: ICD-10-CM

## 2021-09-07 RX ORDER — CITALOPRAM 40 MG/1
40 TABLET ORAL
Qty: 90 TABLET | Refills: 0 | Status: SHIPPED | OUTPATIENT
Start: 2021-09-07

## 2021-09-07 NOTE — TELEPHONE ENCOUNTER
Pt called requesting a refill of his citalopram  Pt last seen by Dr Merlinda Ports 04/2021  Please schedule an appt with one of the providers

## 2021-09-07 NOTE — TELEPHONE ENCOUNTER
Patient has not been seen since 2019 with no upcoming appt on file  I submitted request for #[phone] with no refills

## 2021-09-07 NOTE — TELEPHONE ENCOUNTER
Pt requesting appt to see Dr Jake Michael for anxiety  Call to front office line goes unanswered  Please contact pt asap

## 2021-09-08 ENCOUNTER — ANESTHESIA (OUTPATIENT)
Dept: GASTROENTEROLOGY | Facility: AMBULARY SURGERY CENTER | Age: 56
End: 2021-09-08

## 2021-09-08 ENCOUNTER — ANESTHESIA EVENT (OUTPATIENT)
Dept: GASTROENTEROLOGY | Facility: AMBULARY SURGERY CENTER | Age: 56
End: 2021-09-08

## 2021-09-08 ENCOUNTER — HOSPITAL ENCOUNTER (OUTPATIENT)
Dept: GASTROENTEROLOGY | Facility: AMBULARY SURGERY CENTER | Age: 56
Setting detail: OUTPATIENT SURGERY
Discharge: HOME | End: 2021-09-08
Attending: INTERNAL MEDICINE
Payer: COMMERCIAL

## 2021-09-08 VITALS
WEIGHT: 261 LBS | DIASTOLIC BLOOD PRESSURE: 64 MMHG | HEIGHT: 73 IN | OXYGEN SATURATION: 94 % | SYSTOLIC BLOOD PRESSURE: 118 MMHG | BODY MASS INDEX: 34.59 KG/M2 | TEMPERATURE: 97.6 F | HEART RATE: 58 BPM | RESPIRATION RATE: 18 BRPM

## 2021-09-08 DIAGNOSIS — Z12.11 COLON CANCER SCREENING: ICD-10-CM

## 2021-09-08 PROCEDURE — G0121 COLON CA SCRN NOT HI RSK IND: HCPCS | Performed by: INTERNAL MEDICINE

## 2021-09-08 RX ORDER — PROPOFOL 10 MG/ML
INJECTION, EMULSION INTRAVENOUS AS NEEDED
Status: DISCONTINUED | OUTPATIENT
Start: 2021-09-08 | End: 2021-09-08

## 2021-09-08 RX ORDER — SODIUM CHLORIDE, SODIUM LACTATE, POTASSIUM CHLORIDE, CALCIUM CHLORIDE 600; 310; 30; 20 MG/100ML; MG/100ML; MG/100ML; MG/100ML
125 INJECTION, SOLUTION INTRAVENOUS CONTINUOUS
Status: DISCONTINUED | OUTPATIENT
Start: 2021-09-08 | End: 2021-09-12 | Stop reason: HOSPADM

## 2021-09-08 RX ADMIN — PROPOFOL 100 MG: 10 INJECTION, EMULSION INTRAVENOUS at 08:01

## 2021-09-08 RX ADMIN — PROPOFOL 50 MG: 10 INJECTION, EMULSION INTRAVENOUS at 08:14

## 2021-09-08 RX ADMIN — PROPOFOL 25 MG: 10 INJECTION, EMULSION INTRAVENOUS at 08:22

## 2021-09-08 RX ADMIN — SODIUM CHLORIDE, SODIUM LACTATE, POTASSIUM CHLORIDE, AND CALCIUM CHLORIDE 125 ML/HR: .6; .31; .03; .02 INJECTION, SOLUTION INTRAVENOUS at 07:51

## 2021-09-08 RX ADMIN — PROPOFOL 50 MG: 10 INJECTION, EMULSION INTRAVENOUS at 08:03

## 2021-09-08 RX ADMIN — PROPOFOL 25 MG: 10 INJECTION, EMULSION INTRAVENOUS at 08:18

## 2021-09-08 RX ADMIN — PROPOFOL 50 MG: 10 INJECTION, EMULSION INTRAVENOUS at 08:09

## 2021-09-08 NOTE — H&P
History and Physical - [facility]  Anne Tejada 49 y o  male MRN: [id number]                  HPI: Anne Tejada is a 49y o  year old male who presents for colonoscopy history of polyps last colonoscopy 15 years ago      REVIEW OF SYSTEMS: Per the HPI, and otherwise unremarkable      Historical Information   Past Medical History:   Diagnosis Date    Abnormal stress ECG with treadmill     Anxiety     Chest pain     Environmental allergies     GERD (gastroesophageal reflux disease)     Hiatal hernia     History of psychiatric treatment     Hyperlipemia     Hypertension     Lightheadedness     MTHFR mutation     Myalgia     Perceptive hearing loss, both sides     pt has hearing aides    Shortness of breath     Stomach problems      Past Surgical History:   Procedure Laterality Date    APPENDECTOMY      COLONOSCOPY      [facility] REPAIR OF NASAL SEPTUM N/A 4/4/2019    Procedure: SEPTOPLASTY, BILATERAL INFERRIOR TURBINATE REDUCTION, RIGHT MAXILLARY ANTROSTOMY;  Surgeon: Jorge Mohamud MD;  Location: [facility];  Service: ENT    [address] [place] [address]      varicose     Social History   Social History     Substance and Sexual Activity   Alcohol Use No     Social History     Substance and Sexual Activity   Drug Use No     Social History     Tobacco Use   Smoking Status Never Smoker   Smokeless Tobacco Never Used     Family History   Problem Relation Age of Onset    Coronary artery disease Mother         sx    Cardiomyopathy Mother         congestive    Hyperlipidemia Mother     Hypertension Mother        Meds/Allergies       Current Outpatient Medications:     aspirin (ECOTRIN LOW STRENGTH) 81 mg EC tablet    atenolol (TENORMIN) 25 mg tablet    Cholecalciferol (VITAMIN D) 2000 units CAPS    citalopram (CeleXA) 40 mg tablet    Coenzyme Q10 200 MG capsule    Folate-B12-Intrinsic Factor (INTRINSI B12-FOLATE) 169-191-02 MCG-MCG-MG TABS    losartan-hydrochlorothiazide (HYZAAR) 50-12 5 mg per tablet    ezetimibe (ZETIA) 10 mg tablet    rosuvastatin (CRESTOR) 20 MG tablet    Current Facility-Administered Medications:     lactated ringers infusion, 125 mL/hr, Intravenous, Continuous, Continue from Pre-op at 09/08/21 0759    Allergies   Allergen Reactions    Atorvastatin Edema     Reaction Date: 10Sep2004;     Amoxicillin Hives     Reaction Date: [id number]; Annotation - [id number]: jjw    Sulfa Antibiotics GI Intolerance     Zithromax z-alyssa - Reaction Date: 18Jul2011; Annotation - [id number]: gastritis  tc/cma    Moxifloxacin Rash     Reaction Date: 03Jun2008; Objective     /79   Pulse 68   Temp 97 6 °F (36 4 °C) (Tympanic)   Resp 18   Ht 6' 1" (1 854 m)   Wt 118 kg (261 lb)   SpO2 97%   BMI 34 43 kg/m²       PHYSICAL EXAM    Gen: NAD  Head: NCAT  CV: RRR  CHEST: Clear  ABD: soft, NT/ND  EXT: no edema      ASSESSMENT/PLAN:  This is a 49y o  year old male here for colonoscopy, and he is stable and optimized for his procedure

## 2021-09-08 NOTE — ANESTHESIA PREPROCEDURE EVALUATION
Procedure:  COLONOSCOPY    Relevant Problems   CARDIO   (+) Benign essential hypertension   (+) Hyperlipidemia      NEURO/PSYCH   (+) Depression with anxiety        Physical Exam    Airway    Mallampati score: II  TM Distance: >3 FB  Neck ROM: full     Dental   No notable dental hx     Cardiovascular  Cardiovascular exam normal    Pulmonary  Pulmonary exam normal     Other Findings        Anesthesia Plan  ASA Score- 2     Anesthesia Type- IV sedation with anesthesia with ASA Monitors  Additional Monitors:   Airway Plan:           Plan Factors-Exercise tolerance (METS): >4 METS  Chart reviewed  Imaging results reviewed  Existing labs reviewed  Patient summary reviewed  Patient is not a current smoker  Induction-     Postoperative Plan-     Informed Consent- Anesthetic plan and risks discussed with patient  I personally reviewed this patient with the CRNA  Discussed and agreed on the Anesthesia Plan with the CRNA  Sania Kwok

## 2021-09-08 NOTE — ANESTHESIA POSTPROCEDURE EVALUATION
Post-Op Assessment Note    CV Status:  Stable       Mental Status:  Sleepy   Hydration Status:  Stable   PONV Controlled:  Controlled   Airway Patency:  Patent      Post Op Vitals Reviewed: Yes      Staff: CRNA         No complications documented      BP   110/71   Temp      Pulse  66   Resp   21   SpO2   99

## 2021-09-10 RX ORDER — ATENOLOL 25 MG/1
25 TABLET ORAL
Qty: 90 TABLET | Refills: 0 | Status: SHIPPED | OUTPATIENT
Start: 2021-09-10

## 2021-09-17 ENCOUNTER — OFFICE VISIT (OUTPATIENT)
Dept: FAMILY MEDICINE CLINIC | Facility: CLINIC | Age: 56
End: 2021-09-17
Payer: COMMERCIAL

## 2021-09-17 VITALS
SYSTOLIC BLOOD PRESSURE: 124 MMHG | BODY MASS INDEX: 34.06 KG/M2 | HEIGHT: 73 IN | RESPIRATION RATE: 18 BRPM | OXYGEN SATURATION: 98 % | TEMPERATURE: 98 F | HEART RATE: 64 BPM | DIASTOLIC BLOOD PRESSURE: 80 MMHG | WEIGHT: 257 LBS

## 2021-09-17 DIAGNOSIS — E78.5 HYPERLIPIDEMIA, UNSPECIFIED HYPERLIPIDEMIA TYPE: ICD-10-CM

## 2021-09-17 DIAGNOSIS — Z00.00 ANNUAL PHYSICAL EXAM: Primary | ICD-10-CM

## 2021-09-17 DIAGNOSIS — I10 BENIGN ESSENTIAL HYPERTENSION: ICD-10-CM

## 2021-09-17 DIAGNOSIS — F41.8 DEPRESSION WITH ANXIETY: ICD-10-CM

## 2021-09-17 PROCEDURE — 3008F BODY MASS INDEX DOCD: CPT | Performed by: FAMILY MEDICINE

## 2021-09-17 PROCEDURE — 3074F SYST BP LT 130 MM HG: CPT | Performed by: FAMILY MEDICINE

## 2021-09-17 PROCEDURE — 3725F SCREEN DEPRESSION PERFORMED: CPT | Performed by: FAMILY MEDICINE

## 2021-09-17 PROCEDURE — 99396 PREV VISIT EST AGE 40-64: CPT | Performed by: FAMILY MEDICINE

## 2021-09-17 PROCEDURE — 3079F DIAST BP 80-89 MM HG: CPT | Performed by: FAMILY MEDICINE

## 2021-09-17 PROCEDURE — 1036F TOBACCO NON-USER: CPT | Performed by: FAMILY MEDICINE

## 2021-09-17 RX ORDER — ALPRAZOLAM 0.25 MG/1
0.25 TABLET ORAL DAILY PRN
Qty: 30 TABLET | Refills: 0 | Status: SHIPPED | OUTPATIENT
Start: 2021-09-17

## 2021-09-17 NOTE — PATIENT INSTRUCTIONS

## 2021-09-17 NOTE — PROGRESS NOTES
[facility]    NAME: Anne Tejada  AGE: 49 y o  SEX: male  : 1965     DATE: 2021     Assessment and Plan:     Problem List Items Addressed This Visit        Cardiovascular and Mediastinum    Benign essential hypertension       Other    Depression with anxiety    Relevant Medications    ALPRAZolam (XANAX) 0 25 mg tablet    Hyperlipidemia      Other Visit Diagnoses     Annual physical exam    -  Primary       reviewed all blood work from April   Patient is to continue seeing Dr Jacque Patrick; blood pressure is controlled today  Depression is uncontrolled with anxiety patient going through social situation  Started on Xanax as needed daily  Patient has had this short course of Xanax in the past almost 8 years ago with his former PCP which seemed to improve his symptoms  Start fish oil daily at least 1200   VIT D  Increased to 4000  PSA: 0 6  CRC screening:Diverticulum near hepatic flexure, question polyps 15 years ago no family history, no polyps today, small internal hemorrhoids  REPEAT in 7 years    Immunizations and preventive care screenings were discussed with patient today  Appropriate education was printed on patient's after visit summary  BMI Counseling: Body mass index is 33 91 kg/m²  The BMI is above normal  Nutrition recommendations include decreasing portion sizes, decreasing fast food intake and limiting drinks that contain sugar  Rationale for BMI follow-up plan is due to patient being overweight or obese  No follow-ups on file       Chief Complaint:     Chief Complaint   Patient presents with   [place] Establish Care     patient here to establish care and have annual exam    Physical Exam     patient has been depressed lately due to family situation    Depression      History of Present Illness:     Adult Annual Physical   Patient here for a comprehensive physical exam    8 years ago had a breakdown; His former PCP did give him a short course of Xanax  I did review the chart and confirmed  Patient states that currently he is working with his brother-in-law finances given that he is not capable of making medical decisions  Unfortunately his sister-in-law is making things a little more difficult  Patient would like something to help him get over this next month    patient is taking his blood pressure medication as prescribed without any difficulties  Patient states he has a history of high cholesterol which led him to go see his cardiologist   Has been seeing him once a year  Patient has no side effects to any the medications at this time  Diet and Physical Activity  · Diet/Nutrition: well balanced diet  · Exercise: no formal exercise  Depression Screening  PHQ-9 Depression Screening    PHQ-9:   Frequency of the following problems over the past two weeks:      Little interest or pleasure in doing things: 2 - more than half the days  Feeling down, depressed, or hopeless: 2 - more than half the days  Trouble falling or staying asleep, or sleeping too much: 0 - not at all  Feeling tired or having little energy: 1 - several days  Poor appetite or overeatin - not at all  Feeling bad about yourself - or that you are a failure or have let yourself or your family down: 1 - several days  Trouble concentrating on things, such as reading the newspaper or watching television: 0 - not at all  Moving or speaking so slowly that other people could have noticed  Or the opposite - being so fidgety or restless that you have been moving around a lot more than usual: 0 - not at all  Thoughts that you would be better off dead, or of hurting yourself in some way: 0 - not at all  PHQ-2 Score: 4  PHQ-9 Score: 6       General Health  · Sleep: sleeps well with CBD  · Hearing: normal - bilateral  With hearing aids  · Vision: wears glasses and Going next month, stigma, color blind  · Dental: regular dental visits   Health  · Symptoms include: none     Review of Systems:     Review of Systems   Constitutional: Negative for activity change, appetite change, chills, fatigue and fever  HENT: Negative for congestion  Respiratory: Negative for cough, chest tightness and shortness of breath  Cardiovascular: Negative for chest pain and leg swelling  Gastrointestinal: Negative for abdominal distention, abdominal pain, constipation, diarrhea, nausea and vomiting  Psychiatric/Behavioral: The patient is nervous/anxious (depression)  All other systems reviewed and are negative       Past Medical History:     Past Medical History:   Diagnosis Date    Abnormal stress ECG with treadmill     Anxiety     Chest pain     Environmental allergies     GERD (gastroesophageal reflux disease)     Hiatal hernia     History of psychiatric treatment     Hyperlipemia     Hypertension     Lightheadedness     MTHFR mutation     Myalgia     Perceptive hearing loss, both sides     pt has hearing aides    Shortness of breath     Stomach problems       Past Surgical History:     Past Surgical History:   Procedure Laterality Date    APPENDECTOMY      COLONOSCOPY      NH REPAIR OF NASAL SEPTUM N/A 4/4/2019    Procedure: SEPTOPLASTY, BILATERAL INFERRIOR TURBINATE REDUCTION, RIGHT MAXILLARY ANTROSTOMY;  Surgeon: Duran Lopez MD;  Location: [facility];  Service: ENT    VASECTOMY      VEIN LIGATION      varicose      Family History:     Family History   Problem Relation Age of Onset    Coronary artery disease Mother         sx    Cardiomyopathy Mother         congestive    Hyperlipidemia Mother     Hypertension Mother       Social History:     Social History     Socioeconomic History    Marital status: /Civil Union     Spouse name: None    Number of children: None    Years of education: None    Highest education level: None   Occupational History    None   Tobacco Use    Smoking status: Never Smoker    Smokeless tobacco: Never Used   Vaping Use    Vaping Use: Never used   Substance and Sexual Activity    Alcohol use: No    Drug use: No    Sexual activity: None   Other Topics Concern    None   Social History Narrative    Sleeps 6-7 hours a day    Stress at work     Social Determinants of Health     Financial Resource Strain:     Difficulty of Paying Living Expenses:    Food Insecurity:     Worried About Running Out of Food in the Last Year:     [address] in the Last Year:    Transportation Needs:     Lack of Transportation (Medical):      Lack of Transportation (Non-Medical):    Physical Activity:     Days of Exercise per Week:     Minutes of Exercise per Session:    Stress:     Feeling of Stress :    Social Connections:     Frequency of Communication with Friends and Family:     Frequency of Social Gatherings with Friends and Family:     Attends [employer] Services:     Active Member of Clubs or Organizations:     Attends Club or Organization Meetings:     Marital Status:    Intimate Partner Violence:     Fear of Current or Ex-Partner:     Emotionally Abused:     Physically Abused:     Sexually Abused:       Current Medications:     Current Outpatient Medications   Medication Sig Dispense Refill    aspirin (ECOTRIN LOW STRENGTH) 81 mg EC tablet Take 1 tablet (81 mg total) by mouth daily with breakfast 90 tablet 3    atenolol (TENORMIN) 25 mg tablet Take 1 tablet (25 mg total) by mouth daily at bedtime 90 tablet 0    Cholecalciferol (VITAMIN D) 2000 units CAPS Take 1 capsule by mouth daily      citalopram (CeleXA) 40 mg tablet Take 1 tablet (40 mg total) by mouth daily at bedtime 90 tablet 0    Coenzyme Q10 200 MG capsule Take 1 capsule by mouth daily      ezetimibe (ZETIA) 10 mg tablet Take 1 tablet (10 mg total) by mouth daily 90 tablet 3    Folate-B12-Intrinsic Factor (INTRINSI B12-FOLATE) 712-365-45 MCG-MCG-MG TABS Take 1 tablet by mouth daily      losartan-hydrochlorothiazide (HYZAAR) 50-12 5 mg per tablet Take 1 tablet by mouth daily 90 tablet 3    rosuvastatin (CRESTOR) 20 MG tablet Take 1 tablet (20 mg total) by mouth daily at bedtime 90 tablet 3    ALPRAZolam (XANAX) 0 25 mg tablet Take 1 tablet (0 25 mg total) by mouth daily as needed for anxiety 30 tablet 0     No current facility-administered medications for this visit  Allergies: Allergies   Allergen Reactions    Atorvastatin Edema     Reaction Date: 10Sep2004;     Amoxicillin Hives     Reaction Date: [id number]; Annotation - [id number]: jjw    Sulfa Antibiotics GI Intolerance     Zithromax z-alyssa - Reaction Date: 18Jul2011; Annotation - [id number]: gastritis  tc/cma    Moxifloxacin Rash     Reaction Date: 03Jun2008; Physical Exam:     /80 (BP Location: Left arm, Patient Position: Sitting, Cuff Size: Large)   Pulse 64   Temp 98 °F (36 7 °C) (Temporal)   Resp 18   Ht 6' 1" (1 854 m)   Wt 117 kg (257 lb)   SpO2 98%   BMI 33 91 kg/m²     Physical Exam  Vitals reviewed  Constitutional:       Appearance: He is well-developed  HENT:      Head: Normocephalic and atraumatic  Right Ear: External ear normal       Left Ear: External ear normal       Nose: Nose normal    Eyes:      Conjunctiva/sclera: Conjunctivae normal       Pupils: Pupils are equal, round, and reactive to light  Cardiovascular:      Rate and Rhythm: Normal rate and regular rhythm  Heart sounds: Normal heart sounds  Pulmonary:      Effort: Pulmonary effort is normal       Breath sounds: Normal breath sounds  No wheezing  Abdominal:      General: Bowel sounds are normal  There is no distension  Palpations: Abdomen is soft  There is no mass  Tenderness: There is no abdominal tenderness  Genitourinary:     Penis: Normal     Musculoskeletal:         General: No tenderness  Normal range of motion  Cervical back: Normal range of motion and neck supple  Skin:     General: Skin is warm        Capillary Refill: Capillary refill takes less than 2 seconds  Neurological:      Mental Status: He is alert and oriented to person, place, and time  Cranial Nerves: No cranial nerve deficit  Sensory: No sensory deficit  Motor: No abnormal muscle tone  Coordination: Coordination normal       Deep Tendon Reflexes: Reflexes normal    Psychiatric:         Behavior: Behavior normal          Thought Content:  Thought content normal          Judgment: Judgment normal           Linda Landrum MD  [address]

## (undated) DEVICE — NEEDLE SPINAL 22G X 3.5IN  QUINCKE

## (undated) DEVICE — BLADE 1884004HR TRICUT 5PK M4 4MM ROTATE: Brand: TRICUT

## (undated) DEVICE — COTTON TIP APPLICTOR 2 PK

## (undated) DEVICE — INTENDED FOR TISSUE SEPARATION, AND OTHER PROCEDURES THAT REQUIRE A SHARP SURGICAL BLADE TO PUNCTURE OR CUT.: Brand: BARD-PARKER ® CARBON RIB-BACK BLADES

## (undated) DEVICE — ANTI-FOG SOLUTION WITH FOAM PAD: Brand: DEVON

## (undated) DEVICE — WAND COBLATION PLASMA TURBINATOR REDUCTION

## (undated) DEVICE — TUBING SUCTION 5MM X 12 FT

## (undated) DEVICE — MAGNETIC INSTRUMENT PAD 16" X 20"; LARGE; DISPOSABLE: Brand: CARDINAL HEALTH

## (undated) DEVICE — VIAL DECANTER

## (undated) DEVICE — SPECIMEN CONTAINER STERILE PEEL PACK

## (undated) DEVICE — PROXIMATE PLUS MD MULTI-DIRECTIONAL RELEASE SKIN STAPLERS CONTAINS 35 STAINLESS STEEL STAPLES APPROXIMATE CLOSED DIMENSIONS: 6.9MM X 3.9MM WIDE: Brand: PROXIMATE

## (undated) DEVICE — NEURO PATTIES 1/2 X 3

## (undated) DEVICE — GLOVE SRG BIOGEL ECLIPSE 7

## (undated) DEVICE — SYRINGE 50ML LL

## (undated) DEVICE — SPLINT 1524050 5PK PAIR DOYLE II AIRWAY: Brand: DOYLE II ™

## (undated) DEVICE — DENTAL PACK: Brand: CARDINAL HEALTH

## (undated) DEVICE — SHEATH 1912000 5PK 4MM/0DEG STORZ XOMED: Brand: ENDO-SCRUB®

## (undated) DEVICE — SCD SEQUENTIAL COMPRESSION COMFORT SLEEVE MEDIUM KNEE LENGTH: Brand: KENDALL SCD

## (undated) DEVICE — BASIC DOUBLE BASIN 2-LF: Brand: MEDLINE INDUSTRIES, INC.